# Patient Record
Sex: MALE | Race: WHITE | NOT HISPANIC OR LATINO | Employment: UNEMPLOYED | URBAN - METROPOLITAN AREA
[De-identification: names, ages, dates, MRNs, and addresses within clinical notes are randomized per-mention and may not be internally consistent; named-entity substitution may affect disease eponyms.]

---

## 2017-08-22 DIAGNOSIS — I48.91 ATRIAL FIBRILLATION, UNSPECIFIED TYPE (HCC): ICD-10-CM

## 2017-08-22 DIAGNOSIS — I10 ESSENTIAL HYPERTENSION: ICD-10-CM

## 2017-08-22 RX ORDER — LISINOPRIL AND HYDROCHLOROTHIAZIDE 20; 12.5 MG/1; MG/1
1 TABLET ORAL DAILY
Qty: 90 TAB | Refills: 0 | Status: SHIPPED | OUTPATIENT
Start: 2017-08-22 | End: 2017-11-28 | Stop reason: SDUPTHER

## 2017-08-22 RX ORDER — LISINOPRIL 20 MG/1
20 TABLET ORAL DAILY
Qty: 90 TAB | Refills: 0 | Status: SHIPPED | OUTPATIENT
Start: 2017-08-22 | End: 2017-12-07 | Stop reason: SDUPTHER

## 2017-08-22 RX ORDER — METOPROLOL SUCCINATE 25 MG/1
25 TABLET, EXTENDED RELEASE ORAL DAILY
Qty: 90 TAB | Refills: 0 | Status: SHIPPED | OUTPATIENT
Start: 2017-08-22 | End: 2017-11-28 | Stop reason: SDUPTHER

## 2017-08-22 NOTE — PROGRESS NOTES
Spoke with patient on the telephone. Patient needs medication refills for his blood pressure. He was seeing cardiology but thinks his cardiologist retired. Refer patient to a new cardiologist. Take all medication as directed.

## 2017-11-28 DIAGNOSIS — I48.91 ATRIAL FIBRILLATION, UNSPECIFIED TYPE (HCC): ICD-10-CM

## 2017-11-28 DIAGNOSIS — I10 ESSENTIAL HYPERTENSION: ICD-10-CM

## 2017-11-28 RX ORDER — METOPROLOL SUCCINATE 25 MG/1
25 TABLET, EXTENDED RELEASE ORAL DAILY
Qty: 90 TAB | Refills: 0 | Status: SHIPPED | OUTPATIENT
Start: 2017-11-28 | End: 2018-10-01

## 2017-11-28 RX ORDER — LISINOPRIL AND HYDROCHLOROTHIAZIDE 20; 12.5 MG/1; MG/1
1 TABLET ORAL DAILY
Qty: 90 TAB | Refills: 0 | Status: SHIPPED | OUTPATIENT
Start: 2017-11-28 | End: 2018-10-01 | Stop reason: SDUPTHER

## 2017-12-07 DIAGNOSIS — I10 ESSENTIAL HYPERTENSION: ICD-10-CM

## 2017-12-07 RX ORDER — LISINOPRIL 20 MG/1
20 TABLET ORAL DAILY
Qty: 90 TAB | Refills: 0 | Status: SHIPPED | OUTPATIENT
Start: 2017-12-07 | End: 2018-03-07

## 2018-01-16 ENCOUNTER — APPOINTMENT (OUTPATIENT)
Dept: RADIOLOGY | Facility: MEDICAL CENTER | Age: 58
End: 2018-01-16
Attending: EMERGENCY MEDICINE
Payer: COMMERCIAL

## 2018-01-16 ENCOUNTER — HOSPITAL ENCOUNTER (EMERGENCY)
Facility: MEDICAL CENTER | Age: 58
End: 2018-01-16
Attending: EMERGENCY MEDICINE
Payer: COMMERCIAL

## 2018-01-16 VITALS
OXYGEN SATURATION: 96 % | BODY MASS INDEX: 36.45 KG/M2 | HEART RATE: 90 BPM | TEMPERATURE: 99.4 F | DIASTOLIC BLOOD PRESSURE: 108 MMHG | RESPIRATION RATE: 18 BRPM | HEIGHT: 78 IN | SYSTOLIC BLOOD PRESSURE: 153 MMHG | WEIGHT: 315 LBS

## 2018-01-16 DIAGNOSIS — M54.41 CHRONIC BILATERAL LOW BACK PAIN WITH BILATERAL SCIATICA: ICD-10-CM

## 2018-01-16 DIAGNOSIS — M54.42 ACUTE BILATERAL LOW BACK PAIN WITH BILATERAL SCIATICA: ICD-10-CM

## 2018-01-16 DIAGNOSIS — M54.42 CHRONIC BILATERAL LOW BACK PAIN WITH BILATERAL SCIATICA: ICD-10-CM

## 2018-01-16 DIAGNOSIS — G89.29 CHRONIC BILATERAL LOW BACK PAIN WITH BILATERAL SCIATICA: ICD-10-CM

## 2018-01-16 DIAGNOSIS — M54.41 ACUTE BILATERAL LOW BACK PAIN WITH BILATERAL SCIATICA: ICD-10-CM

## 2018-01-16 PROCEDURE — 72100 X-RAY EXAM L-S SPINE 2/3 VWS: CPT

## 2018-01-16 PROCEDURE — 99284 EMERGENCY DEPT VISIT MOD MDM: CPT

## 2018-01-16 PROCEDURE — 72170 X-RAY EXAM OF PELVIS: CPT

## 2018-01-16 RX ORDER — CYCLOBENZAPRINE HCL 10 MG
10 TABLET ORAL 3 TIMES DAILY PRN
Qty: 30 TAB | Refills: 0 | Status: SHIPPED | OUTPATIENT
Start: 2018-01-16 | End: 2018-01-23

## 2018-01-16 ASSESSMENT — PAIN SCALES - GENERAL: PAINLEVEL_OUTOF10: 6

## 2018-01-16 NOTE — LETTER
"  FORM C-4:  EMPLOYEE’S CLAIM FOR COMPENSATION/ REPORT OF INITIAL TREATMENT  EMPLOYEE’S CLAIM - PROVIDE ALL INFORMATION REQUESTED   First Name  Bg Last Name  Jerry Birthdate             Age  1960 57 y.o. Sex  male Claim Number   Home Employee Address  [address]  [facility]                                     Zip  [address] Height  1.981 m (6' 6\") Weight  (!) 159.1 kg (350 lb 12 oz) [facility]     Mailing Employee Address                           [address]   [facility]               Zip  [address] Telephone  [phone] (home) [phone] (work) Primary Language Spoken  ENGLISH   Insurer  [employer] [facility] [employer]   MISC WORKERS COMP Employee's Occupation (Job Title) When Injury or Occupational Disease Occurred     Employer's Name  [employer] Telephone  [phone]    Employer Address  [address] Zip  [address]   Date of Injury  1/11/2018       Hour of Injury  10:30 AM Date Employer Notified  1/12/2018 Last Day of Work after Injury or Occupational Disease  1/16/2018 Supervisor to Whom Injury Reported  Howard Campo   Address or Location of Accident (if applicable)  [[address]]   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 (if applicable)  lifting chain link fence    How did this injury or occupational disease occur? Be specific and answer in detail. Use additional sheet if necessary)  lifting fallen fence & it continued to fall in opposite direction pulling me with it.  fell   to ground on all fours & heard back \"cracking\"   If you believe that you have an occupational disease, when did you first have knowledge of the disability and it relationship to your employment?  na Witnesses to the Accident  Ap Alvarado     Nature of Injury or Occupational Disease  Strain  Part(s) of Body Injured or Affected  Lower Back Area (Lumbar Area & Lumbo-Sacral), Hip (R), Lower Leg (R)    I certify that the " above is true and correct to the best of my knowledge and that I have provided this information in order to obtain the benefits of [place] inclusive or Chapter 617 of [place]RS[place]  I hereby authorize any physician, chiropractor, surgeon, practitioner, or other person, any hospital, including [facility], any medical service organization, any insurance company, or other institution or organization to release to each other, any medical or other information, including benefits paid or payable, pertinent to this injury or disease, except information relative to diagnosis, treatment and/or counseling for AIDS, psychological conditions, alcohol or controlled substances, for which I must give specific authorization.  A Photostat of this authorization shall be as valid as the original.   Date  January 16, 2018 Place  [facility] Employee’s Signature   THIS REPORT MUST BE COMPLETED AND MAILED WITHIN 3 WORKING DAYS OF TREATMENT   Place  [facility], EMERGENCY DEPT  Name of Facility   [facility]   Date  1/16/2018 Diagnosis  (M54.42,  M54.41) Acute bilateral low back pain with bilateral sciatica  (M54.42,  M54.41,  G89.29) Chronic bilateral low back pain with bilateral sciatica Is there evidence the injured employee was under the influence of alcohol and/or another controlled substance at the time of accident?   Hour  4:40 PM Description of Injury or Disease  Acute bilateral low back pain with bilateral sciatica  Chronic bilateral low back pain with bilateral sciatica No   Treatment  Prescription for cyclobenzaprine, instructed use ibuprofen and ice.  Have you advised the patient to remain off work five days or more?         No   X-Ray Findings  Negative  Comments:no acute lumbar spine fracture or pelvic abnormality   If Yes   From Date    To Date      From information  "given by the employee, together with medical evidence, can you directly connect this injury or occupational disease as job incurred?  Yes If No, is the employee capable of: Full Duty  No Modified Duty  Yes   Is additional medical care by a physician indicated?  Yes  Comments:possible physical therapy If Modified Duty, Specify any Limitations / Restrictions  No lifting greater than 10 pounds for 2 days.     Do you know of any previous injury or disease contributing to this condition or occupational disease?    Comments:the patient does have a history of laminectomy in the past with acute injury upon this previous surgical intervention.   Date  1/16/2018 Print Doctor’s Name  Jarret Mac certify the employer’s copy of this form was mailed on:   Address  [address] [address] [phone] Insurer’s Use Only   [facility]  [address]    Provider’s Tax ID Number  [id number] Telephone  Dept: [phone]    Doctor’s Signature  e-JARRET Bryson D.O. Degree  MD    Original - TREATING PHYSICIAN OR CHIROPRACTOR   Pg 2-Insurer/TPA   Pg 3-Employer   Pg 4-Employee                                                                                                  Form C-4 (rev01/03)     BRIEF DESCRIPTION OF RIGHTS AND BENEFITS  (Pursuant to [place]RS 616C.050)    Notice of Injury or Occupational Disease (Incident Report Form C-1): If an injury or occupational disease (OD) arises out of and in the course of employment, you must provide written notice to your employer as soon as practicable, but no later than 7 days after the accident or OD. Your employer shall maintain a sufficient supply of the required forms.    Claim for Compensation (Form C-4): If medical treatment is sought, the form C-4 is available at the place of initial treatment. A completed \"Claim for Compensation\" (Form C-4) must be filed within 90 days after an accident or OD. The treating physician or chiropractor " must, within 3 working days after treatment, complete and mail to the employer, the employer's insurer and third-party , the Claim for Compensation.    Medical Treatment: If you require medical treatment for your on-the-job injury or OD, you may be required to select a physician or chiropractor from a list provided by your workers’ compensation insurer, if it has contracted with an Organization for Managed Care (MCO) or Preferred Provider Organization (PPO) or providers of health care. If your employer has not entered into a contract with an MCO or PPO, you may select a physician or chiropractor from the Panel of Physicians and Chiropractors. Any medical costs related to your industrial injury or OD will be paid by your insurer.    Temporary Total Disability (TTD): If your doctor has certified that you are unable to work for a period of at least 5 consecutive days, or 5 cumulative days in a 20-day period, or places restrictions on you that your employer does not accommodate, you may be entitled to TTD compensation.    Temporary Partial Disability (TPD): If the wage you receive upon reemployment is less than the compensation for TTD to which you are entitled, the insurer may be required to pay you TPD compensation to make up the difference. TPD can only be paid for a maximum of 24 months.    Permanent Partial Disability (PPD): When your medical condition is stable and there is an indication of a PPD as a result of your injury or OD, within 30 days, your insurer must arrange for an evaluation by a rating physician or chiropractor to determine the degree of your PPD. The amount of your PPD award depends on the date of injury, the results of the PPD evaluation and your age and wage.    Permanent Total Disability (PTD): If you are medically certified by a treating physician or chiropractor as permanently and totally disabled and have been granted a PTD status by your insurer, you are entitled to receive  monthly benefits not to exceed 66 2/3% of your average monthly wage. The amount of your PTD payments is subject to reduction if you previously received a PPD award.    Vocational Rehabilitation Services: You may be eligible for vocational rehabilitation services if you are unable to return to the job due to a permanent physical impairment or permanent restrictions as a result of your injury or occupational disease.    Transportation and Per Megan Reimbursement: You may be eligible for travel expenses and per megan associated with medical treatment.  Reopening: You may be able to reopen your claim if your condition worsens after claim closure.    Appeal Process: If you disagree with a written determination issued by the insurer or the insurer does not respond to your request, you may appeal to the Department of Administration, , by following the instructions contained in your determination letter. You must appeal the determination within 70 days from the date of the determination letter at [address], [address], [place], [place] [address], or [address], [address], [place] [address]. If you disagree with the  decision, you may appeal to the Department of Administration, . You must file your appeal within 30 days from the date of the  decision letter at [address], [address], [place], [place] [address], or [address], [address],  [address]. If you disagree with a decision of an , you may file a petition for judicial review with the District Court. You must do so within 30 days of the Appeal Officer’s decision. You may be represented by an  at your own expense or you may contact the [facility] for possible representation.    [place]  for Injured Workers (NAIW): If you disagree with a  decision, you may request that NAIW represent you without charge at an Appeals  Officer Hearing. For information regarding denial of benefits, you may contact the [facility] at: [address], [address], [place] [address], [phone]) [phone], or [address], [place] [address], [phone]    To File a Complaint with the Division: If you wish to file a complaint with the  of the Division of Industrial Relations (DIR), please contact the Workers’ Compensation Section, [address] [address], [place], [place] [address], telephone ([phone]) [phone], or [address], [address], [place], [place] [address], telephone [phone].    For assistance with Workers’ Compensation Issues: you may contact the Office of the Governor Consumer Health Assistance, [address] [place] [address], [address], [place], [place] [address], Toll Free 1-864.306.1841, Web site: http://govcha.[facility].nv.us, E-mail [email or web address].                                                                                                                                                                               __________________________________________________________________                                    January 16, 2018            Employee Name / Signature                                                                                                                            Date                                       D-2 (rev. 10/07)

## 2018-01-16 NOTE — LETTER
"  FORM C-4:  EMPLOYEE’S CLAIM FOR COMPENSATION/ REPORT OF INITIAL TREATMENT  EMPLOYEE’S CLAIM - PROVIDE ALL INFORMATION REQUESTED   First Name  Bg Last Name  Jerry Birthdate             Age  1960 57 y.o. 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 Be specific and answer in detail. Use additional sheet if necessary)  Walked down hill stepped on uneven ground caught myself   If you believe that you have an occupational disease, when did you first have knowledge of the disability and it relationship to your employment?  N/A Witnesses to the Accident  Ernesto      Nature of Injury or Occupational Disease  Strain  Part(s) of Body Injured or Affected  Lower Leg (R), Upper Leg (R), N/A    I certify that the above is true and correct to the best of my knowledge and that I have provided this information in order to obtain the benefits of " [place] 616A [place], inclusive or Chapter 617 of [place]RS).  I hereby authorize any physician, chiropractor, surgeon, practitioner, or other person, any hospital, including [facility] hospital, any medical service organization, any insurance company, or other institution or organization to release to each other, any medical or other information, including benefits paid or payable, pertinent to this injury or disease, except information relative to diagnosis, treatment and/or counseling for AIDS, psychological conditions, alcohol or controlled substances, for which I must give specific authorization.  A Photostat of this authorization shall be as valid as the original.   Date Place   Employee’s Signature   THIS REPORT MUST BE COMPLETED AND MAILED WITHIN 3 WORKING DAYS OF TREATMENT   Place  [facility], EMERGENCY DEPT  Name of Facility   [facility]   Date  1/16/2018 Diagnosis  (M54.42,  G89.29) Chronic bilateral low back pain with left-sided sciatica Is there evidence the injured employee was under the influence of alcohol and/or another controlled substance at the time of accident?   Hour  4:16 PM Description of Injury or Disease  Chronic bilateral low back pain with left-sided sciatica No   Treatment  Prescription for cyclobenzaprine, instructed use ibuprofen and ice.  Have you advised the patient to remain off work five days or more?         No   X-Ray Findings  Negative  Comments:no acute lumbar spine fracture or pelvic abnormality   If Yes   From Date    To Date      From information given by the employee, together with medical evidence, can you directly connect this injury or occupational disease as job incurred?  Yes If No, is the employee capable of: Full Duty  No Modified Duty  Yes   Is additional medical care by a physician indicated?  Yes  Comments:possible physical therapy If  "Modified Duty, Specify any Limitations / Restrictions  No lifting greater than 10 pounds for 2 days.     Do you know of any previous injury or disease contributing to this condition or occupational disease?    Comments:the patient does have a history of laminectomy in the past with acute injury upon this previous surgical intervention.   Date  1/16/2018 Print Doctor’s Name  Jarret Mac certify the employer’s copy of this form was mailed on:   Address  [address] [id number] [phone] Insurer’s Use Only   [facility]  [address]    Provider’s Tax ID Number  [id number] Telephone  Dept: [phone]    Doctor’s Signature  e-JARRET Bryson D.O. Degree       Original - TREATING PHYSICIAN OR CHIROPRACTOR   Pg 2-Insurer/TPA   Pg 3-Employer   Pg 4-Employee                                                                                                  Form C-4 (rev01/03)     BRIEF DESCRIPTION OF RIGHTS AND BENEFITS  ([place]ursuant to [place]RS 616C.050)    Notice of Injury or Occupational Disease (Incident Report Form C-1): If an injury or occupational disease (OD) arises out of and in the course of employment, you must provide written notice to your employer as soon as practicable, but no later than 7 days after the accident or OD. Your employer shall maintain a sufficient supply of the required forms.    Claim for Compensation (Form C-4): If medical treatment is sought, the form C-4 is available at the place of initial treatment. A completed \"Claim for Compensation\" (Form C-4) must be filed within 90 days after an accident or OD. The treating physician or chiropractor must, within 3 working days after treatment, complete and mail to the employer, the employer's insurer and third-party , the Claim for Compensation.    Medical Treatment: If you require medical treatment for your on-the-job injury or OD, you may be required to select a physician or chiropractor " from a list provided by your workers’ compensation insurer, if it has contracted with an Organization for Managed Care (MCO) or Preferred Provider Organization (PPO) or providers of health care. If your employer has not entered into a contract with an MCO or PPO, you may select a physician or chiropractor from the Panel of Physicians and Chiropractors. Any medical costs related to your industrial injury or OD will be paid by your insurer.    Temporary Total Disability (TTD): If your doctor has certified that you are unable to work for a period of at least 5 consecutive days, or 5 cumulative days in a 20-day period, or places restrictions on you that your employer does not accommodate, you may be entitled to TTD compensation.    Temporary Partial Disability (TPD): If the wage you receive upon reemployment is less than the compensation for TTD to which you are entitled, the insurer may be required to pay you TPD compensation to make up the difference. TPD can only be paid for a maximum of 24 months.    Permanent Partial Disability (PPD): When your medical condition is stable and there is an indication of a PPD as a result of your injury or OD, within 30 days, your insurer must arrange for an evaluation by a rating physician or chiropractor to determine the degree of your PPD. The amount of your PPD award depends on the date of injury, the results of the PPD evaluation and your age and wage.    Permanent Total Disability (PTD): If you are medically certified by a treating physician or chiropractor as permanently and totally disabled and have been granted a PTD status by your insurer, you are entitled to receive monthly benefits not to exceed 66 2/3% of your average monthly wage. The amount of your PTD payments is subject to reduction if you previously received a PPD award.    Vocational Rehabilitation Services: You may be eligible for vocational rehabilitation services if you are unable to return to the job due to a  permanent physical impairment or permanent restrictions as a result of your injury or occupational disease.    Transportation and Per Megan Reimbursement: You may be eligible for travel expenses and per megan associated with medical treatment.  Reopening: You may be able to reopen your claim if your condition worsens after claim closure.    Appeal Process: If you disagree with a written determination issued by the insurer or the insurer does not respond to your request, you may appeal to the Department of Administration, , by following the instructions contained in your determination letter. You must appeal the determination within 70 days from the date of the determination letter at [address], [address], [place], [place] [address], or [address], [address], [place] [address]. If you disagree with the  decision, you may appeal to the Department of Administration, . You must file your appeal within 30 days from the date of the  decision letter at [address], [address], [place], [place] [address], or [address], [place] [address]. If you disagree with a decision of an , you may file a petition for judicial review with the District Court. You must do so within 30 days of the Appeal Officer’s decision. You may be represented by an  at your own expense or you may contact the [facility] for possible representation.    [place]  for Injured Workers ([place]IW): If you disagree with a  decision, you may request that NAIW represent you without charge at an  Hearing. For information regarding denial of benefits, you may contact the [facility] at: [address], [address], [place] [address], [phone]) [phone], or [address], [place] [address], [phone]    To File a Complaint with the Division: If you wish to file a complaint  with the  of the Division of Industrial Relations (DIR), please contact the Workers’ Compensation Section, [address], [address], [place], [place] [address], telephone (341) 796-5203, or [address], [address], [place], [place] [address], telephone [phone].    For assistance with Workers’ Compensation Issues: you may contact the Office of the Governor Consumer Health Assistance, [address] [place] [address], [address], [place], [place] [address], Toll Free 1-378.868.7770, Web site: http://Oh BiBi.[facility].nv., E-mail [email or web address].                                                                                                                                                                               __________________________________________________________________                                    _________________            Employee Name / Signature                                                                                                                            Date                                       D-2 (rev. 10/07)

## 2018-01-16 NOTE — ED NOTES
Assumed care of pt in armstrong bed. erp at bedside. Pt amb to er from work with c/o bilat hip pain

## 2018-01-16 NOTE — LETTER
"  FORM C-4:  EMPLOYEE’S CLAIM FOR COMPENSATION/ REPORT OF INITIAL TREATMENT  EMPLOYEE’S CLAIM - PROVIDE ALL INFORMATION REQUESTED   First Name  Bg Last Name  Jerry Birthdate             Age  1960 57 y.o. 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 (if applicable)  lifting chain link fence    How did this injury or occupational disease occur? Be specific and answer in detail. Use additional sheet if necessary)  lifting fallen fence & it continued to fall in opposite direction pulling me with it.  fell   to ground on all fours & heard back \"cracking\"   If you believe that you have an occupational disease, when did you first have knowledge of the disability and it relationship to your employment?  na Witnesses to the Accident  Ap Alvarado     Nature of Injury or Occupational Disease  Strain  Part(s) of Body Injured or Affected  Lower Back Area (Lumbar Area & Lumbo-Sacral), Hip (R), Lower Leg (R)    "   I certify that the above is true and correct to the best of my knowledge and that I have provided this information in order to obtain the benefits of [place]  I hereby authorize any physician, chiropractor, surgeon, practitioner, or other person, any hospital, including [facility] or [facility], any medical service organization, any insurance company, or other institution or organization to release to each other, any medical or other information, including benefits paid or payable, pertinent to this injury or disease, except information relative to diagnosis, treatment and/or counseling for AIDS, psychological conditions, alcohol or controlled substances, for which I must give specific authorization.  A Photostat of this authorization shall be as valid as the original.   Date  01/16/2018 [facility] Employee’s Signature   THIS REPORT MUST BE COMPLETED AND MAILED WITHIN 3 WORKING DAYS OF TREATMENT   Place  [facility] [place], EMERGENCY DEPT  Name of Facility   [facility] [place]   Date  1/16/2018 Diagnosis  (M54.42,  G89.29) Chronic bilateral low back pain with left-sided sciatica Is there evidence the injured employee was under the influence of alcohol and/or another controlled substance at the time of accident?   Hour  4:20 PM Description of Injury or Disease  Chronic bilateral low back pain with left-sided sciatica No   Treatment  Prescription for cyclobenzaprine, instructed use ibuprofen and ice.  Have you advised the patient to remain off work five days or more?         No   X-Ray Findings  Negative  Comments:no acute lumbar spine fracture or pelvic abnormality   If Yes   From Date    To Date      From information given by the employee, together with medical evidence, can you directly connect this injury or occupational disease as  "job incurred?  Yes If No, is the employee capable of: Full Duty  No Modified Duty  Yes   Is additional medical care by a physician indicated?  Yes  Comments:possible physical therapy If Modified Duty, Specify any Limitations / Restrictions  No lifting greater than 10 pounds for 2 days.     Do you know of any previous injury or disease contributing to this condition or occupational disease?    Comments:the patient does have a history of laminectomy in the past with acute injury upon this previous surgical intervention.   Date  1/16/2018 Print Doctor’s Name  Jarret Mac certify the employer’s copy of this form was mailed on:   Address  [address]  [facility] [place] [facility] [address] [phone] Insurer’s Use Only   [facility]  [address]    Provider’s Tax ID Number  [id number] Telephone  Dept: [phone]    Doctor’s Signature  e-JARRET Bryson D.O. Degree   MD    Original - TREATING PHYSICIAN OR CHIROPRACTOR   Pg 2-Insurer/TPA   Pg 3-Employer   Pg 4-Employee                                                                                                  Form C-4 (rev01/03)     BRIEF DESCRIPTION OF RIGHTS AND BENEFITS  (Pursuant to [place] 616C.050)    Notice of Injury or Occupational Disease (Incident Report Form C-1): If an injury or occupational disease (OD) arises out of and in the course of employment, you must provide written notice to your employer as soon as practicable, but no later than 7 days after the accident or OD. Your employer shall maintain a sufficient supply of the required forms.    Claim for Compensation (Form C-4): If medical treatment is sought, the form C-4 is available at the place of initial treatment. A completed \"Claim for Compensation\" (Form C-4) must be filed within 90 days after an accident or OD. The treating physician or chiropractor must, within 3 working days after treatment, complete and mail to the employer, the employer's insurer and third-party " , the Claim for Compensation.    Medical Treatment: If you require medical treatment for your on-the-job injury or OD, you may be required to select a physician or chiropractor from a list provided by your workers’ compensation insurer, if it has contracted with an Organization for Managed Care (MCO) or Preferred Provider Organization (PPO) or providers of health care. If your employer has not entered into a contract with an MCO or PPO, you may select a physician or chiropractor from the Panel of Physicians and Chiropractors. Any medical costs related to your industrial injury or OD will be paid by your insurer.    Temporary Total Disability (TTD): If your doctor has certified that you are unable to work for a period of at least 5 consecutive days, or 5 cumulative days in a 20-day period, or places restrictions on you that your employer does not accommodate, you may be entitled to TTD compensation.    Temporary Partial Disability (TPD): If the wage you receive upon reemployment is less than the compensation for TTD to which you are entitled, the insurer may be required to pay you TPD compensation to make up the difference. TPD can only be paid for a maximum of 24 months.    Permanent Partial Disability (PPD): When your medical condition is stable and there is an indication of a PPD as a result of your injury or OD, within 30 days, your insurer must arrange for an evaluation by a rating physician or chiropractor to determine the degree of your PPD. The amount of your PPD award depends on the date of injury, the results of the PPD evaluation and your age and wage.    Permanent Total Disability (PTD): If you are medically certified by a treating physician or chiropractor as permanently and totally disabled and have been granted a PTD status by your insurer, you are entitled to receive monthly benefits not to exceed 66 2/3% of your average monthly wage. The amount of your PTD payments is subject to  reduction if you previously received a PPD award.    Vocational Rehabilitation Services: You may be eligible for vocational rehabilitation services if you are unable to return to the job due to a permanent physical impairment or permanent restrictions as a result of your injury or occupational disease.    Transportation and Per Megan Reimbursement: You may be eligible for travel expenses and per megan associated with medical treatment.  Reopening: You may be able to reopen your claim if your condition worsens after claim closure.    Appeal Process: If you disagree with a written determination issued by the insurer or the insurer does not respond to your request, you may appeal to the Department of Administration, , by following the instructions contained in your determination letter. You must appeal the determination within 70 days from the date of the determination letter at [address], [address], [place], [place] [address], or [address], [place] [address]. If you disagree with the  decision, you may appeal to the Department of Administration, . You must file your appeal within 30 days from the date of the  decision letter at [address], [address], [place], [place] [address], or [address], [address], [place], [place] [address]. If you disagree with a decision of an , you may file a petition for judicial review with the District Court. You must do so within 30 days of the Appeal Officer’s decision. You may be represented by an  at your own expense or you may contact the [facility] for possible representation.    [place]  for Injured Workers (NAIW): If you disagree with a  decision, you may request that NAIW represent you without charge at an  Hearing. For information regarding denial of benefits, you may contact the [facility] at: [address],  [address], [place] [address], [phone], or [address], [address], [place] [address], [phone]    To File a Complaint with the Division: If you wish to file a complaint with the  of the Division of Industrial Relations (DIR), please contact the Workers’ Compensation Section, [address], [address], [place], [place] [address], telephone [phone], or [address], [address], [place], [place] [address], telephone [phone].    For assistance with Workers’ Compensation Issues: you may contact the Office of the Governor Consumer Health Assistance, [address] [place] [address], [address], [place], [place] [address], Toll Free 1-579.526.5020, Web site: http://[employer].cn.[facility], [facility].[email or web address]., E-mail [email or web address], [email or web address].                                                                                                                                                                               __________________________________________________________________                                    _________________            Employee Name / Signature                                                                                                                            Date                                       D-2 (rev. 10/07)

## 2018-01-16 NOTE — ED PROVIDER NOTES
ED Provider Note    CHIEF COMPLAINT  Chief Complaint   Patient presents with   • Fall     fell over fence at work today  low back pain, caren hip pain and caren leg pain       HPI  Bg Edwards is a 57 y.o. male who presents to the emergency department with complaint of low back pain. He states he has significant low back pain is chronic and set laminectomies in the past. Patient states that a few days ago he was trying to hold a fence and fell over with the fence giveaway resulting in strain to his low back. He denies loss of sensation or strength to his lower extremities, saddle anesthesia, urinary constant urinary retention, fever, use of anti-inflammatories. He is here secondary to the fact that he states his dad broke his back and became paralyzed and no one really figured it out he is asking for x-rays.  Pain is dull in nature, 7/10 with radiation to bilateral hips into left knee  REVIEW OF SYSTEMS  Positives as above. Pertinent negatives include urinary incontinence, urinary retention, saddle anesthesia, fever, use of anticoagulation, loss of sensation or strength to his lower extremities  All other review of systems are negative    PAST MEDICAL HISTORY  Past Medical History:   Diagnosis Date   • Abnormal EKG    • Atrial fibrillation (CMS-HCC)    • Dyspnea    • Hypertension    • Pain 6   • Paroxysmal atrial fibrillation (CMS-HCC)    • Snoring        FAMILY HISTORY  Noncontributory    SOCIAL HISTORY  Social History     Social History   • Marital status:      Spouse name: N/A   • Number of children: N/A   • Years of education: N/A     Social History Main Topics   • Smoking status: Former Smoker     Types: Cigars   • Smokeless tobacco: Not on file      Comment: 15 YEARS  QUIT IN 95   • Alcohol use 1.5 oz/week     3 Standard drinks or equivalent per week      Comment: weekly    • Drug use: No      Comment: marijuana   • Sexual activity: Not on file      Comment: na     Other Topics Concern   • Not on  "file     Social History Narrative   • No narrative on file       SURGICAL HISTORY  Past Surgical History:   Procedure Laterality Date   • FOREIGN BODY REMOVAL  6/29/2010    Performed by BRI HICKS at SURGERY SAME DAY [facility]   • LUMBAR LAMINECTOMY DISKECTOMY Bilateral    • OTHER ORTHOPEDIC SURGERY  AC RECONSTRUCTION   • OTHER ORTHOPEDIC SURGERY  LEFT THUMB   • TONSILLECTOMY     • VASECTOMY         CURRENT MEDICATIONS  Home Medications     Reviewed by Brandon Simons (Pharmacy Tech) on 01/16/18 at 1458  Med List Status: Complete   Medication Last Dose Status   lisinopril (PRINIVIL) 20 MG Tab 1/16/2018 Active   lisinopril-hydrochlorothiazide (PRINZIDE, ZESTORETIC) 20-12.5 MG per tablet 1/16/2018 Active   MAGNESIUM PO 1/16/2018 Active   metoprolol SR (TOPROL XL) 25 MG TABLET SR 24 HR 1/16/2018 Active   MILK THISTLE PO 1/16/2018 Active   POTASSIUM PO 1/16/2018 Active   Saw Palmetto, Serenoa repens, (SAW PALMETTO PO) 1/16/2018 Active   Thiamine Mononitrate (VITAMIN B1 PO) 1/16/2018 Active                ALLERGIES  Allergies   Allergen Reactions   • Nkda [No Known Drug Allergy]        PHYSICAL EXAM  VITAL SIGNS: /108   Pulse 90   Temp 37.4 °C (99.4 °F)   Resp 18   Ht 1.981 m (6' 6\")   Wt (!) 159.1 kg (350 lb 12 oz)   SpO2 96%   BMI 40.53 kg/m²    Constitutional: Well developed, Well nourished, No acute distress, Non-toxic appearance.   Eyes: PERRLA, EOMI, Conjunctiva normal, No discharge.    Abdomen: Bowel sounds normal, Soft, No tenderness, No guarding, No rebound, No pulsatile masses.   Skin: Warm, Dry, No erythema, No rash.   Extremities: Full range of motion, no deformity, no edema.  Back: No central spinous process tenderness in the lumbar region, does have paravertebral muscle spasm  Skin: No erythema, no rash, no purpura  Neurologic: No saddle anesthesia, leg with 5/5 bilaterally, plantar flexion dorsiflexion 5/5 bilaterally, DTRs are 2/4 L4-S1 bilaterally, normal " ambulation  Psychiatric: Affect normal for clinical presentation.      RADIOLOGY/PROCEDURES  DX-LUMBAR SPINE-2 OR 3 VIEWS   Final Result      1.  No radiographic evidence of acute traumatic injury      2.  Worsening moderate degenerative disc disease and diffuse idiopathic skeletal hyperostosis now with grade 1 L4/5 anterolisthesis and 11 degrees dextroscoliosis      3.  Probable right hemilaminectomies      DX-PELVIS-1 OR 2 VIEWS   Final Result      No radiographic evidence of acute traumatic injury      Severe right, mild left hip osteoarthritis        COURSE & MEDICAL DECISION MAKING  Pertinent Labs & Imaging studies reviewed. (See chart for details)  This is a pleasant 57-year-old gentleman presents with chronic back pain and acute exacerbation. He has no evidence of cauda equina syndrome, no red flags for epidural abscess, epidural hematoma. X-rays are completed secondary to traumatic injury reveals no evidence of fracture or significant acute changes although does have degenerative disc disease findings as well as evidence of previous hemilaminectomies. The patient received cyclobenzaprine and prescription for the same. I have filled out the Workmen's Comp. C4 part to and the patient will following up with his Workmen's Compensation. She return precautions have been given to [facility] as he denies spine surgeon on call this facility.    Discharge Medication List as of 1/16/2018  4:40 PM      START taking these medications    Details   cyclobenzaprine (FLEXERIL) 10 MG Tab Take 1 Tab by mouth 3 times a day as needed., Disp-30 Tab, R-0, Normal             FINAL IMPRESSION     1. Acute bilateral low back pain with bilateral sciatica    2. Chronic bilateral low back pain with bilateral sciatica      DISPOSITION:  Patient will be discharged home in stable condition.    FOLLOW UP:  [address] [address]  [phone]    Schedule an appointment as soon as possible  for a visit      [address] [phone]    If symptoms worsen      OUTPATIENT MEDICATIONS:  Discharge Medication List as of 1/16/2018  4:40 PM      START taking these medications    Details   cyclobenzaprine (FLEXERIL) 10 MG Tab Take 1 Tab by mouth 3 times a day as needed., Disp-30 Tab, R-0, Normal                    Electronically signed by: Ankur Mac, 1/16/2018 3:56 PM

## 2018-01-17 NOTE — ED NOTES
D/c instructions reviewed with pt. To  prescription at [facility] in [place] , f/u with pcp regarding bp as well as workers comp. Return to regional for worsening s/s

## 2018-01-17 NOTE — DISCHARGE INSTRUCTIONS
Follow up with your primary care physician for re-evaluation of your blood pressure.      Chronic Back Pain   When back pain lasts longer than 3 months, it is called chronic back pain. People with chronic back pain often go through certain periods that are more intense (flare-ups).   CAUSES  Chronic back pain can be caused by wear and tear (degeneration) on different structures in your back. These structures include:  · The bones of your spine (vertebrae) and the joints surrounding your spinal cord and nerve roots (facets).  · The strong, fibrous tissues that connect your vertebrae (ligaments).  Degeneration of these structures may result in pressure on your nerves. This can lead to constant pain.  HOME CARE INSTRUCTIONS  · Avoid bending, heavy lifting, prolonged sitting, and activities which make the problem worse.  · Take brief periods of rest throughout the day to reduce your pain. Lying down or standing usually is better than sitting while you are resting.  · Take over-the-counter or prescription medicines only as directed by your caregiver.  SEEK IMMEDIATE MEDICAL CARE IF:   · You have weakness or numbness in one of your legs or feet.  · You have trouble controlling your bladder or bowels.  · You have nausea, vomiting, abdominal pain, shortness of breath, or fainting.     This information is not intended to replace advice given to you by your health care provider. Make sure you discuss any questions you have with your health care provider.     Document Released: 01/25/2006 Document Revised: 03/11/2013 Document Reviewed: 12/01/2012  [employer] Patient Education ©2016 [employer]

## 2018-01-17 NOTE — ED NOTES
Attempted to d/c pt-states paperwork in correct-is not left sided, is bilateral. Will discuss with erp when available

## 2018-01-23 ENCOUNTER — OCCUPATIONAL MEDICINE (OUTPATIENT)
Dept: OCCUPATIONAL MEDICINE | Facility: CLINIC | Age: 58
End: 2018-01-23
Payer: COMMERCIAL

## 2018-01-23 VITALS
RESPIRATION RATE: 16 BRPM | TEMPERATURE: 98.7 F | HEART RATE: 95 BPM | DIASTOLIC BLOOD PRESSURE: 90 MMHG | WEIGHT: 315 LBS | HEIGHT: 77 IN | OXYGEN SATURATION: 96 % | BODY MASS INDEX: 37.19 KG/M2 | SYSTOLIC BLOOD PRESSURE: 148 MMHG

## 2018-01-23 DIAGNOSIS — S39.012D STRAIN OF LUMBAR REGION, SUBSEQUENT ENCOUNTER: ICD-10-CM

## 2018-01-23 PROCEDURE — 99204 OFFICE O/P NEW MOD 45 MIN: CPT | Performed by: PREVENTIVE MEDICINE

## 2018-01-23 RX ORDER — ETODOLAC 500 MG/1
500 TABLET, FILM COATED ORAL 2 TIMES DAILY
Qty: 60 TAB | Refills: 1 | Status: SHIPPED | OUTPATIENT
Start: 2018-01-23 | End: 2018-10-29

## 2018-01-23 RX ORDER — TIZANIDINE 4 MG/1
4 TABLET ORAL EVERY 6 HOURS PRN
Qty: 30 TAB | Refills: 3 | Status: SHIPPED | OUTPATIENT
Start: 2018-01-23 | End: 2019-02-19

## 2018-01-23 NOTE — LETTER
[address],   [address] [place] [address]  Phone:  [phone] - Fax:  [phone]   Occupational Health [facility] Progress Report and Disability Certification  Date of Service: 1/23/2018   No Show:  No  Date / Time of Next Visit: 2/15/2018@3:00 PM   Claim Information   Patient Name: Bg Edwards  Claim Number:     Employer:   [employer] Date of Injury: 1/10/2018     Insurer / TPA: [employer]  ID / SSN:     Occupation: construction  Diagnosis: The encounter diagnosis was Strain of lumbar region, subsequent encounter.    Medical Information   Related to Industrial Injury?   Comments:indeterminate    Subjective Complaints:  DOI 1/10/2018. Mechanism of injury-fell forward onto ground and tweaked back. 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 This occurred when he fell over a chain link fence he was lifting. He was seen in the emergency department where x-rays were negative. He reports no significant improvement. He continues to have constant midline lumbar back pain with radiation to both hips. He notes paresthesias in the left thigh. No urinary symptoms.   Objective Findings: Appearance: Well-developed, well-nourished.   Mental Status: Mood and Affect normal. Pleasant. Cooperative. Appropriate.   ENT: Oropharynx clear. Moist mucous membranes. Hearing normal.   Eyes: Pupils reactive. Conjunctiva normal. No scleral icterus.   Neck: Trachea Midline. No thyromegaly. No masses.  Cardiovascular: Normal rate. Regular rhythm. Normal heart sounds.   Chest: Effort normal. Breath sounds clear.   Skin: Skin is warm and dry. No rash.   Musculoskeletal: Back exam shows midline lumbosacral surgical scar. Pain with forward flexion and extension. Straight leg raise negative. Distal neurovascular intact.     Pre-Existing Condition(s):     Assessment:   Condition Same    Status: Additional Care Required  Permanent Disability:No    Plan: MedicationMedication (NOT  at Work)PTDiagnostics    Diagnostics: MRI    Comments:  MRI requested    Disability Information   Status: Released to Restricted Duty    From:  1/23/2018  Through: 2/15/2018 Restrictions are: Temporary   Physical Restrictions   Sitting:    Standing:    Stooping:    Bending:  < or = to 1 hr/day   Squatting:    Walking:    Climbing:    Pushing:      Pulling:    Other:    Reaching Above Shoulder (L):   Reaching Above Shoulder (R):       Reaching Below Shoulder (L):    Reaching Below Shoulder (R):      Not to exceed Weight Limits   Carrying(hrs):   Weight Limit(lb):   Lifting(hrs):   Weight  Limit(lb): < or = to 10 pounds   Comments:      Repetitive Actions   Hands: i.e. Fine Manipulations from Grasping:     Feet: i.e. Operating Foot Controls:     Driving / Operate Machinery:     Physician Name: Farzad Broussard M.D. Physician Signature: FARZAD Cabrera M.D. e-Signature: Dr. Jefry Browning, Medical Director   Clinic Name / Location: [address],   [address] [place] [address] Clinic Phone Number: Dept: [phone]   Appointment Time: 3:30 Pm Visit Start Time: 3:29 PM   Check-In Time:  3:19 Pm Visit Discharge Time:  4:20 PM   Original-Treating Physician or Chiropractor    Page 2-Insurer/TPA    Page 3-Employer    Page 4-Employee

## 2018-01-23 NOTE — PROGRESS NOTES
Subjective:      Bg Edwards is a 57 y.o. male who presents with Follow-Up (WC DOI 1/10/18 Lower back feeling the same room 3)      DOI 1/10/2018. Mechanism of injury-fell forward onto ground and tweaked back. 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 This occurred when he fell over a chain link fence he was lifting. He was seen in the emergency department where x-rays were negative. He reports no significant improvement. He continues to have constant midline lumbar back pain with radiation to both hips. He notes paresthesias in the left thigh. No urinary symptoms.     [facility]    ROS  Comprehensive medical history form reviewed. Pertinent positives and negatives included in HPI.    PFSH: reviewed in Epic    PMH:  has a past medical history of Abnormal EKG; Atrial fibrillation (CMS-HCC); Dyspnea; Hypertension; Pain (6); Paroxysmal atrial fibrillation (CMS-HCC); and Snoring. He also has no past medical history of Allergy; Anemia; Anxiety; CAD (coronary artery disease); Cancer (CMS-[facility]); Carotid artery disease (CMS-[facility]); CHF (congestive heart failure) (CMS-[facility]); COPD; Diabetes; Goiter; Heart attack; Heart murmur; Hyperlipidemia; Kidney disease; PVD (peripheral vascular disease) (CMS-[facility]); Stroke (CMS-[facility]); or Ulcer (CMS-[facility]).  MEDS:   Current Outpatient Prescriptions:   •  etodolac (LODINE) 500 MG tablet, Take 1 Tab by mouth 2 times a day., Disp: 60 Tab, Rfl: 1  •  tizanidine (ZANAFLEX) 4 MG Tab, Take 1 Tab by mouth every 6 hours as needed., Disp: 30 Tab, Rfl: 3  •  POTASSIUM PO, Take 1 Cap by mouth every day., Disp: , Rfl:   •  Saw Palmetto, Serenoa repens, (SAW PALMETTO PO), Take 1 Cap by mouth every day., Disp: , Rfl:   •  MAGNESIUM PO, Take 1 Cap by mouth every day., Disp: , Rfl:   •  Thiamine Mononitrate (VITAMIN B1 PO), Take 1 Tab by mouth every day., Disp: , Rfl:   •  MILK THISTLE PO, Take 1 Cap by mouth every day., Disp: , Rfl:   •  lisinopril (PRINIVIL) 20 MG Tab, Take 1 Tab by mouth every day  "for 90 days., Disp: 90 Tab, Rfl: 0  •  lisinopril-hydrochlorothiazide (PRINZIDE, ZESTORETIC) 20-12.5 MG per tablet, Take 1 Tab by mouth every day., Disp: 90 Tab, Rfl: 0  •  metoprolol SR (TOPROL XL) 25 MG TABLET SR 24 HR, Take 1 Tab by mouth every day., Disp: 90 Tab, Rfl: 0  ALLERGIES:   Allergies   Allergen Reactions   • Nkda [No Known Drug Allergy]      SURGHX:   Past Surgical History:   Procedure Laterality Date   • FOREIGN BODY REMOVAL  6/29/2010    Performed by BRI HICKS at SURGERY SAME DAY [facility] ORS   • LUMBAR LAMINECTOMY DISKECTOMY Bilateral    • OTHER ORTHOPEDIC SURGERY  AC RECONSTRUCTION   • OTHER ORTHOPEDIC SURGERY  LEFT THUMB   • TONSILLECTOMY     • VASECTOMY       SOCHX:  reports that he has quit smoking. His smoking use included Cigars. He does not have any smokeless tobacco history on file. He reports that he drinks about 1.5 oz of alcohol per week . He reports that he does not use drugs.  Work Status: Job /Assistant supervisor with [employer]  FH: No pertinent hereditary disorders.        Objective:     /90   Pulse 95   Temp 37.1 °C (98.7 °F)   Resp 16   Ht 1.956 m (6' 5\")   Wt (!) 147.4 kg (325 lb)   SpO2 96%   BMI 38.54 kg/m²      Physical Exam    Appearance: Well-developed, well-nourished.   Mental Status: Mood and Affect normal. Pleasant. Cooperative. Appropriate.   ENT: Oropharynx clear. Moist mucous membranes. Hearing normal.   Eyes: Pupils reactive. Conjunctiva normal. No scleral icterus.   Neck: Trachea Midline. No thyromegaly. No masses.  Cardiovascular: Normal rate. Regular rhythm. Normal heart sounds.   Chest: Effort normal. Breath sounds clear.   Skin: Skin is warm and dry. No rash.   Musculoskeletal: Back exam shows midline lumbosacral surgical scar. Pain with forward flexion and extension. Straight leg raise negative. Distal neurovascular intact.         Assessment/Plan:     1. Strain of lumbar region, subsequent encounter  New to Occupational " Health from emergency department   Condition unimproved  Will request further diagnostic studies to traumatic event and lower extremity symptoms  - etodolac (LODINE) 500 MG tablet; Take 1 Tab by mouth 2 times a day.  Dispense: 60 Tab; Refill: 1  - tizanidine (ZANAFLEX) 4 MG Tab; Take 1 Tab by mouth every 6 hours as needed.  Dispense: 30 Tab; Refill: 3  - REFERRAL TO PHYSICAL THERAPY Reason for Therapy: Eval/Treat/Report  - REFERRAL TO RADIOLOGY  - MR-LUMBAR SPINE-W/O; Future  - Restricted activity  - Recheck in 2-3 weeks or sooner if MRI accomplished

## 2018-01-24 RX ORDER — NABUMETONE 750 MG/1
750 TABLET, FILM COATED ORAL 2 TIMES DAILY
Qty: 60 TAB | Refills: 1 | Status: SHIPPED | OUTPATIENT
Start: 2018-01-24 | End: 2018-10-01

## 2018-02-15 ENCOUNTER — OCCUPATIONAL MEDICINE (OUTPATIENT)
Dept: OCCUPATIONAL MEDICINE | Facility: CLINIC | Age: 58
End: 2018-02-15
Payer: COMMERCIAL

## 2018-02-15 VITALS
SYSTOLIC BLOOD PRESSURE: 152 MMHG | BODY MASS INDEX: 37.19 KG/M2 | TEMPERATURE: 98 F | DIASTOLIC BLOOD PRESSURE: 90 MMHG | OXYGEN SATURATION: 95 % | HEART RATE: 111 BPM | HEIGHT: 77 IN | RESPIRATION RATE: 16 BRPM | WEIGHT: 315 LBS

## 2018-02-15 DIAGNOSIS — S39.012D STRAIN OF LUMBAR REGION, SUBSEQUENT ENCOUNTER: ICD-10-CM

## 2018-02-15 PROCEDURE — 99213 OFFICE O/P EST LOW 20 MIN: CPT | Performed by: PREVENTIVE MEDICINE

## 2018-02-15 NOTE — LETTER
[address],   [address] [address]  Phone:  [phone] - Fax:  [phone]   Occupational Health [facility] Progress Report and Disability Certification  Date of Service: 2/15/2018   No Show:  No  Date / Time of Next Visit: 3/7/2018 @ 4:30pm   Claim Information   Patient Name: Bg Edwards  Claim Number:     Employer:   [employer] Date of Injury: 1/10/2018     Insurer / TPA: [employer]  ID / SSN:     Occupation: construction  Diagnosis: The encounter diagnosis was Strain of lumbar region, subsequent encounter.    Medical Information   Related to Industrial Injury?   Comments:Indeterminate    Subjective Complaints:  DOI 1/10/2018. Mechanism of injury-fell forward onto ground and tweaked back. 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 He seems to indicate slight improvement with physical therapy. He continues to have pain best localized to the lateral upper right hip and anterior hip which is worse with certain movements especially right hip flexion. He states that he feels fairly well in the beginning of the day but then has difficulty later with prolonged walking. He is tolerating most of his regular work duties. MRI has been approved but not scheduled.   Objective Findings: Appearance: Well-developed, well-nourished.   Mental Status: Mood and Affect normal. Pleasant. Cooperative. Appropriate.   Musculoskeletal: Back exam shows normal posture. Normal gait. Right hip shows no tenderness. Pain with certain movements especially flexion.     Pre-Existing Condition(s):     Assessment:   Condition Improved    Status: Additional Care Required  Permanent Disability:No    Plan:      Diagnostics:      Comments:       Disability Information   Status: Released to Restricted Duty    From:  2/15/2018  Through: 3/8/2018 Restrictions are: Temporary   Physical Restrictions   Sitting:    Standing:    Stooping:    Bending:  < or = to 1 hr/day      Squatting:    Walking:    Climbing:    Pushing:      Pulling:    Other:    Reaching Above Shoulder (L):   Reaching Above Shoulder (R):       Reaching Below Shoulder (L):    Reaching Below Shoulder (R):      Not to exceed Weight Limits   Carrying(hrs):   Weight Limit(lb):   Lifting(hrs):   Weight  Limit(lb): < or = to 10 pounds   Comments:      Repetitive Actions   Hands: i.e. Fine Manipulations from Grasping:     Feet: i.e. Operating Foot Controls:     Driving / Operate Machinery:     Physician Name: Farzad Broussard M.D. Physician Signature: FARZAD Cabrera M.D. e-Signature: Dr. Jefry Browning, Medical Director   Clinic Name / Location: [address],   [address] [place] [id number] Clinic Phone Number: Dept: [phone]   Appointment Time: 4:00 Pm Visit Start Time: 4:18 PM   Check-In Time:  4:00 Pm Visit Discharge Time:  4:53pm   Original-Treating Physician or Chiropractor    Page 2-Insurer/TPA    Page 3-Employer    Page 4-Employee

## 2018-02-16 NOTE — PROGRESS NOTES
Subjective:      Bg Edwards is a 57 y.o. male who presents with Follow-Up (WC DOI 1/10/18 Lower back - R Hip - L Hip - Same - ROOM 2)      DOI 1/10/2018. Mechanism of injury-fell forward onto ground and tweaked back. 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 He seems to indicate slight improvement with physical therapy. He continues to have pain best localized to the lateral upper right hip and anterior hip which is worse with certain movements especially right hip flexion. He states that he feels fairly well in the beginning of the day but then has difficulty later with prolonged walking. He is tolerating most of his regular work duties. MRI has been approved but not scheduled.     HPI    ROS  PFSH:  WORK STATUS: Restricted activity  PMH:  has a past medical history of Abnormal EKG; Atrial fibrillation (CMS-HCC); Dyspnea; Hypertension; Pain (6); Paroxysmal atrial fibrillation (CMS-HCC); and Snoring. He also has no past medical history of Allergy; Anemia; Anxiety; CAD (coronary artery disease); Cancer (CMS-HCC); Carotid artery disease (CMS-HCC); CHF (congestive heart failure) (CMS-HCC); COPD; Diabetes; Goiter; Heart attack; Heart murmur; Hyperlipidemia; Kidney disease; PVD (peripheral vascular disease) (CMS-HCC); Stroke (CMS-HCC); or Ulcer (CMS-HCC).  MEDS:   Current Outpatient Prescriptions:   •  nabumetone (RELAFEN) 750 MG Tab, Take 1 Tab by mouth 2 times a day., Disp: 60 Tab, Rfl: 1  •  etodolac (LODINE) 500 MG tablet, Take 1 Tab by mouth 2 times a day., Disp: 60 Tab, Rfl: 1  •  tizanidine (ZANAFLEX) 4 MG Tab, Take 1 Tab by mouth every 6 hours as needed., Disp: 30 Tab, Rfl: 3  •  POTASSIUM PO, Take 1 Cap by mouth every day., Disp: , Rfl:   •  Saw Palmetto, Serenoa repens, (SAW PALMETTO PO), Take 1 Cap by mouth every day., Disp: , Rfl:   •  MAGNESIUM PO, Take 1 Cap by mouth every day., Disp: , Rfl:   •  Thiamine Mononitrate (VITAMIN B1 PO), Take 1 Tab by mouth every day., Disp: , Rfl:   •  MILK  "THISTLE PO, Take 1 Cap by mouth every day., Disp: , Rfl:   •  lisinopril (PRINIVIL) 20 MG Tab, Take 1 Tab by mouth every day for 90 days., Disp: 90 Tab, Rfl: 0  •  lisinopril-hydrochlorothiazide (PRINZIDE, ZESTORETIC) 20-12.5 MG per tablet, Take 1 Tab by mouth every day., Disp: 90 Tab, Rfl: 0  •  metoprolol SR (TOPROL XL) 25 MG TABLET SR 24 HR, Take 1 Tab by mouth every day., Disp: 90 Tab, Rfl: 0       Objective:     /90   Pulse (!) 111   Temp 36.7 °C (98 °F)   Resp 16   Ht 1.956 m (6' 5\")   Wt (!) 147.4 kg (325 lb)   SpO2 95%   BMI 38.54 kg/m²      Physical Exam    Appearance: Well-developed, well-nourished.   Mental Status: Mood and Affect normal. Pleasant. Cooperative. Appropriate.   Musculoskeletal: Back exam shows normal posture. Normal gait. Right hip shows no tenderness. Pain with certain movements especially flexion.         Assessment/Plan:     1. Strain of lumbar region, subsequent encounter  Condition ongoing  Physical therapy in progress  MRI pending  Restricted activity  Recheck in 3 weeks with anticipated physiatry consultation  Will consider further diagnostic studies of the head including x-rays and/or MRI of right hip    "

## 2018-03-07 ENCOUNTER — OCCUPATIONAL MEDICINE (OUTPATIENT)
Dept: OCCUPATIONAL MEDICINE | Facility: CLINIC | Age: 58
End: 2018-03-07
Payer: COMMERCIAL

## 2018-03-07 VITALS
OXYGEN SATURATION: 94 % | TEMPERATURE: 96.7 F | HEIGHT: 66 IN | WEIGHT: 315 LBS | HEART RATE: 100 BPM | BODY MASS INDEX: 50.62 KG/M2 | RESPIRATION RATE: 16 BRPM

## 2018-03-07 DIAGNOSIS — S39.012D STRAIN OF LUMBAR REGION, SUBSEQUENT ENCOUNTER: ICD-10-CM

## 2018-03-07 DIAGNOSIS — M51.26 LUMBAR DISC HERNIATION: ICD-10-CM

## 2018-03-07 DIAGNOSIS — M51.36 DEGENERATIVE DISC DISEASE, LUMBAR: ICD-10-CM

## 2018-03-07 DIAGNOSIS — Z98.890 STATUS POST LAMINECTOMY: ICD-10-CM

## 2018-03-07 PROCEDURE — 99213 OFFICE O/P EST LOW 20 MIN: CPT | Performed by: PREVENTIVE MEDICINE

## 2018-03-07 RX ORDER — METHOCARBAMOL 500 MG/1
1000 TABLET, FILM COATED ORAL 4 TIMES DAILY
Qty: 120 TAB | Refills: 0 | Status: SHIPPED | OUTPATIENT
Start: 2018-03-07 | End: 2018-10-01

## 2018-03-07 ASSESSMENT — PAIN SCALES - GENERAL: PAINLEVEL: 8=MODERATE-SEVERE PAIN

## 2018-03-07 NOTE — LETTER
[address],   [address] [address]  Phone:  [phone] - Fax:  [phone]   [facility] Progress Report and Disability Certification  Date of Service: 3/7/2018   No Show:  No  Date / Time of Next Visit: 4/20/2018 @ 4:15 PM    Claim Information   Patient Name: Bg Edwards  Claim Number:     Employer:   [employer]  Date of Injury: 1/10/2018     Insurer / TPA: [employer]  ID / SSN:     Occupation: construction  Diagnosis: Diagnoses of Strain of lumbar region, subsequent encounter, Lumbar disc herniation, Status post laminectomy, and Degenerative disc disease, lumbar were pertinent to this visit.    Medical Information   Related to Industrial Injury?   Comments:Indeterminate    Subjective Complaints:  DOI 1/10/2018. Mechanism of injury-fell forward onto ground and tweaked back. 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 He still complains of 8 out of 10 pain. He seems to indicate he is well and the morning and has worsening pain through the day. He is requesting alternative medication.   Objective Findings: MRI shows left paracentral disc extrusion at L3-4 impinging on the L4 nerve root, laminectomy changes L4-5 and L5-S1, degenerative changes including bilateral neural foraminal narrowing and central canal stenosis.   Pre-Existing Condition(s): Degenerative disc disease lumbar spine, laminectomy   Assessment:   Condition Same    Status: Additional Care Required  Permanent Disability:No    Plan: Consultation    Diagnostics:      Comments:  Physiatry consultation for epidural steroids    Disability Information   Status: Released to Restricted Duty    From:  3/7/2018  Through: 4/19/2018 Restrictions are: Temporary   Physical Restrictions   Sitting:    Standing:    Stooping:    Bending:  < or = to 1 hr/day   Squatting:    Walking:    Climbing:    Pushing:      Pulling:    Other:    Reaching Above Shoulder (L):    Reaching Above Shoulder (R):       Reaching Below Shoulder (L):    Reaching Below Shoulder (R):      Not to exceed Weight Limits   Carrying(hrs):   Weight Limit(lb):   Lifting(hrs):   Weight  Limit(lb): < or = to 10 pounds   Comments:      Repetitive Actions   Hands: i.e. Fine Manipulations from Grasping:     Feet: i.e. Operating Foot Controls:     Driving / Operate Machinery:     Physician Name: Farzad Broussard M.D. Physician Signature: FARZAD Cabrera M.D. e-Signature: Dr. Jefry Browning, Medical Director   Clinic Name / Location: [address],   [address] [place] [address], [place] [address] Clinic Phone Number: Dept: [phone]   Appointment Time: 4:30 Pm Visit Start Time: 4:36 PM   Check-In Time:  4:35 Pm Visit Discharge Time:  5:20 PM    Original-Treating Physician or Chiropractor    Page 2-Insurer/TPA    Page 3-Employer    Page 4-Employee

## 2018-03-08 NOTE — PROGRESS NOTES
Subjective:      Bg Edwards is a 57 y.o. male who presents with Follow-Up (WC DOI 1/10/18 Lower back - R Hip - L Hip - Better - ROOM 3)      DOI 1/10/2018. Mechanism of injury-fell forward onto ground and tweaked back. 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 He still complains of 8 out of 10 pain. He seems to indicate he is well and the morning and has worsening pain through the day. He is requesting alternative medication.     HPI    ROS  PFSH:  WORK STATUS: Restricted activity  PMH:  has a past medical history of Abnormal EKG; Atrial fibrillation (CMS-[facility]); Dyspnea; Hypertension; Pain (6); Paroxysmal atrial fibrillation (CMS-[facility]); and Snoring. He also has no past medical history of Allergy; Anemia; Anxiety; CAD (coronary artery disease); Cancer (CMS-[facility]); Carotid artery disease (CMS-[facility]); CHF (congestive heart failure) (CMS-HCC); COPD; Diabetes; Goiter; Heart attack; Heart murmur; Hyperlipidemia; Kidney disease; PVD (peripheral vascular disease) (CMS-[facility]); Stroke (CMS-[facility]); or Ulcer (CMS-HCC).  MEDS:   Current Outpatient Prescriptions:   •  methocarbamol (ROBAXIN) 500 MG Tab, Take 2 Tabs by mouth 4 times a day., Disp: 120 Tab, Rfl: 0  •  nabumetone (RELAFEN) 750 MG Tab, Take 1 Tab by mouth 2 times a day., Disp: 60 Tab, Rfl: 1  •  etodolac (LODINE) 500 MG tablet, Take 1 Tab by mouth 2 times a day., Disp: 60 Tab, Rfl: 1  •  tizanidine (ZANAFLEX) 4 MG Tab, Take 1 Tab by mouth every 6 hours as needed., Disp: 30 Tab, Rfl: 3  •  POTASSIUM PO, Take 1 Cap by mouth every day., Disp: , Rfl:   •  Saw Palmetto, Serenoa repens, (SAW PALMETTO PO), Take 1 Cap by mouth every day., Disp: , Rfl:   •  MAGNESIUM PO, Take 1 Cap by mouth every day., Disp: , Rfl:   •  Thiamine Mononitrate (VITAMIN B1 PO), Take 1 Tab by mouth every day., Disp: , Rfl:   •  MILK THISTLE PO, Take 1 Cap by mouth every day., Disp: , Rfl:   •  lisinopril (PRINIVIL) 20 MG Tab, Take 1 Tab by mouth every day for 90 days., Disp: 90 Tab,  "Rfl: 0  •  lisinopril-hydrochlorothiazide (PRINZIDE, ZESTORETIC) 20-12.5 MG per tablet, Take 1 Tab by mouth every day., Disp: 90 Tab, Rfl: 0  •  metoprolol SR (TOPROL XL) 25 MG TABLET SR 24 HR, Take 1 Tab by mouth every day., Disp: 90 Tab, Rfl: 0       Objective:     Pulse 100   Temp 35.9 °C (96.7 °F)   Resp 16   Ht 1.676 m (5' 6\")   Wt (!) 147.4 kg (325 lb)   SpO2 94%   BMI 52.46 kg/m²      Physical Exam    MRI shows left paracentral disc extrusion at L3-4 impinging on the L4 nerve root, laminectomy changes L4-5 and L5-S1, degenerative changes including bilateral neural foraminal narrowing and central canal stenosis.       Assessment/Plan:     1. Strain of lumbar region, subsequent encounter  2. Lumbar disc herniation  3. Status post laminectomy  4. Degenerative disc disease, lumbar  No significant change  - REFERRAL TO PHYSIATRY (PMR)  - methocarbamol (ROBAXIN) 500 MG Tab; Take 2 Tabs by mouth 4 times a day.  Dispense: 120 Tab; Refill: 0  - Restricted activity  - Recheck 6 weeks unless care transferred specialist      "

## 2018-10-01 ENCOUNTER — OFFICE VISIT (OUTPATIENT)
Dept: CARDIOLOGY | Facility: MEDICAL CENTER | Age: 58
End: 2018-10-01
Payer: COMMERCIAL

## 2018-10-01 VITALS
SYSTOLIC BLOOD PRESSURE: 184 MMHG | WEIGHT: 315 LBS | OXYGEN SATURATION: 97 % | HEIGHT: 77 IN | BODY MASS INDEX: 37.19 KG/M2 | DIASTOLIC BLOOD PRESSURE: 102 MMHG | HEART RATE: 92 BPM

## 2018-10-01 DIAGNOSIS — E78.2 MIXED HYPERLIPIDEMIA: ICD-10-CM

## 2018-10-01 DIAGNOSIS — G47.33 OSA (OBSTRUCTIVE SLEEP APNEA): ICD-10-CM

## 2018-10-01 DIAGNOSIS — I10 ESSENTIAL HYPERTENSION: ICD-10-CM

## 2018-10-01 DIAGNOSIS — R94.31 NONSPECIFIC ABNORMAL ELECTROCARDIOGRAM (ECG) (EKG): ICD-10-CM

## 2018-10-01 DIAGNOSIS — I48.91 ATRIAL FIBRILLATION, UNSPECIFIED TYPE (HCC): ICD-10-CM

## 2018-10-01 DIAGNOSIS — I48.0 PAROXYSMAL ATRIAL FIBRILLATION (HCC): ICD-10-CM

## 2018-10-01 DIAGNOSIS — R06.09 DYSPNEA ON EXERTION: ICD-10-CM

## 2018-10-01 DIAGNOSIS — Z01.810 PRE-OPERATIVE CARDIOVASCULAR EXAMINATION: ICD-10-CM

## 2018-10-01 LAB — EKG IMPRESSION: NORMAL

## 2018-10-01 PROCEDURE — 99205 OFFICE O/P NEW HI 60 MIN: CPT | Performed by: INTERNAL MEDICINE

## 2018-10-01 PROCEDURE — 93000 ELECTROCARDIOGRAM COMPLETE: CPT | Performed by: INTERNAL MEDICINE

## 2018-10-01 RX ORDER — AMLODIPINE BESYLATE 10 MG/1
10 TABLET ORAL DAILY
Qty: 30 TAB | Refills: 11 | Status: SHIPPED | OUTPATIENT
Start: 2018-10-01 | End: 2019-10-03 | Stop reason: SDUPTHER

## 2018-10-01 RX ORDER — LISINOPRIL AND HYDROCHLOROTHIAZIDE 20; 12.5 MG/1; MG/1
1 TABLET ORAL DAILY
Qty: 90 TAB | Refills: 0 | Status: SHIPPED | OUTPATIENT
Start: 2018-10-01 | End: 2018-12-27 | Stop reason: SDUPTHER

## 2018-10-01 RX ORDER — LISINOPRIL 20 MG/1
20 TABLET ORAL DAILY
COMMUNITY
End: 2018-10-01

## 2018-10-01 ASSESSMENT — ENCOUNTER SYMPTOMS
COUGH: 0
NEUROLOGICAL NEGATIVE: 1
CONSTITUTIONAL NEGATIVE: 1
WHEEZING: 0
GASTROINTESTINAL NEGATIVE: 1
MUSCULOSKELETAL NEGATIVE: 1
SHORTNESS OF BREATH: 0
EYES NEGATIVE: 1
CLAUDICATION: 0
PND: 0
WEAKNESS: 0
RESPIRATORY NEGATIVE: 1
SORE THROAT: 0
STRIDOR: 0
DIZZINESS: 0
BRUISES/BLEEDS EASILY: 0
CHILLS: 0
LOSS OF CONSCIOUSNESS: 0
PALPITATIONS: 0
HEMOPTYSIS: 0
CARDIOVASCULAR NEGATIVE: 1
SPUTUM PRODUCTION: 0
ORTHOPNEA: 0
FEVER: 0

## 2018-10-01 NOTE — PROGRESS NOTES
Chief Complaint   Patient presents with   • HTN (Controlled)   • Atrial Fibrillation       Subjective:   Bg Edwards is a 58 y.o. male who presents today as a preop for hip replacement surgery.  He is a 58-year-old male with a history of paroxysmal atrial fibrillation hypertension hyperlipidemia.  He has been a couple of years since we have seen him.  He has not had an echo or nuclear stress test.  In terms of his limitations he can walk about 50 feet before he has to stop due to his hip pain.  He does have high blood pressure but has not been taking medications in some time.  His ECG does show evidence of LVH.  He also has sleep apnea but is not been on treatment for sleep apnea in a number of years.  He previously was taking 3 medications for his blood pressure but has been off these for years.    Past Medical History:   Diagnosis Date   • Abnormal EKG    • Atrial fibrillation (HCC)    • Dyspnea    • Hypertension    • Mixed hyperlipidemia 10/1/2018   • Pain 6   • Paroxysmal atrial fibrillation (HCC)    • Snoring      Past Surgical History:   Procedure Laterality Date   • FOREIGN BODY REMOVAL  6/29/2010    Performed by BRI HICKS at SURGERY SAME DAY [facility] ORS   • LUMBAR LAMINECTOMY DISKECTOMY Bilateral    • OTHER ORTHOPEDIC SURGERY  AC RECONSTRUCTION   • OTHER ORTHOPEDIC SURGERY  LEFT THUMB   • TONSILLECTOMY     • VASECTOMY       Family History   Problem Relation Age of Onset   • Heart Attack Father      Social History     Social History   • Marital status:      Spouse name: N/A   • Number of children: N/A   • Years of education: N/A     Occupational History   • Not on file.     Social History Main Topics   • Smoking status: Former Smoker     Types: Cigars   • Smokeless tobacco: Never Used      Comment: 15 YEARS  QUIT IN 95   • Alcohol use 1.5 oz/week     3 Standard drinks or equivalent per week      Comment: weekly    • Drug use: No      Comment: marijuana   • Sexual activity: Not on file       Comment: na     Other Topics Concern   • Not on file     Social History Narrative   • No narrative on file     Allergies   Allergen Reactions   • Nkda [No Known Drug Allergy]      Outpatient Encounter Prescriptions as of 10/1/2018   Medication Sig Dispense Refill   • amLODIPine (NORVASC) 10 MG Tab Take 1 Tab by mouth every day. 30 Tab 11   • lisinopril-hydrochlorothiazide (PRINZIDE, ZESTORETIC) 20-12.5 MG per tablet Take 1 Tab by mouth every day. 90 Tab 0   • etodolac (LODINE) 500 MG tablet Take 1 Tab by mouth 2 times a day. 60 Tab 1   • tizanidine (ZANAFLEX) 4 MG Tab Take 1 Tab by mouth every 6 hours as needed. 30 Tab 3   • [DISCONTINUED] AMLODIPINE BESYLATE PO Take  by mouth.     • [DISCONTINUED] lisinopril (PRINIVIL) 20 MG Tab Take 20 mg by mouth every day.     • [DISCONTINUED] methocarbamol (ROBAXIN) 500 MG Tab Take 2 Tabs by mouth 4 times a day. (Patient not taking: Reported on 10/1/2018) 120 Tab 0   • [DISCONTINUED] nabumetone (RELAFEN) 750 MG Tab Take 1 Tab by mouth 2 times a day. (Patient not taking: Reported on 10/1/2018) 60 Tab 1   • [DISCONTINUED] POTASSIUM PO Take 1 Cap by mouth every day.     • [DISCONTINUED] Saw Palmetto, Serenoa repens, (SAW PALMETTO PO) Take 1 Cap by mouth every day.     • [DISCONTINUED] MAGNESIUM PO Take 1 Cap by mouth every day.     • [DISCONTINUED] Thiamine Mononitrate (VITAMIN B1 PO) Take 1 Tab by mouth every day.     • [DISCONTINUED] MILK THISTLE PO Take 1 Cap by mouth every day.     • [DISCONTINUED] lisinopril-hydrochlorothiazide (PRINZIDE, ZESTORETIC) 20-12.5 MG per tablet Take 1 Tab by mouth every day. (Patient not taking: Reported on 10/1/2018) 90 Tab 0   • [DISCONTINUED] metoprolol SR (TOPROL XL) 25 MG TABLET SR 24 HR Take 1 Tab by mouth every day. (Patient not taking: Reported on 10/1/2018) 90 Tab 0     No facility-administered encounter medications on file as of 10/1/2018.      Review of Systems   Constitutional: Negative.  Negative for chills, fever and  "malaise/fatigue.   HENT: Negative.  Negative for sore throat.    Eyes: Negative.    Respiratory: Negative.  Negative for cough, hemoptysis, sputum production, shortness of breath, wheezing and stridor.    Cardiovascular: Negative.  Negative for chest pain, palpitations, orthopnea, claudication, leg swelling and PND.   Gastrointestinal: Negative.    Genitourinary: Negative.    Musculoskeletal: Negative.    Skin: Negative.    Neurological: Negative.  Negative for dizziness, loss of consciousness and weakness.   Endo/Heme/Allergies: Negative.  Does not bruise/bleed easily.   All other systems reviewed and are negative.       Objective:   BP (!) 184/102 (BP Location: Left arm, Patient Position: Sitting, BP Cuff Size: Large adult)   Pulse 92   Ht 1.956 m (6' 5\")   Wt (!) 162.8 kg (359 lb)   SpO2 97%   BMI 42.57 kg/m²     Physical Exam   Constitutional: He appears well-developed and well-nourished. No distress.   HENT:   Head: Normocephalic and atraumatic.   Right Ear: External ear normal.   Left Ear: External ear normal.   Nose: Nose normal.   Mouth/Throat: No oropharyngeal exudate.   Eyes: Pupils are equal, round, and reactive to light. Conjunctivae and EOM are normal. Right eye exhibits no discharge. Left eye exhibits no discharge. No scleral icterus.   Neck: Neck supple. No JVD present.   Cardiovascular: Normal rate, regular rhythm and intact distal pulses.  Exam reveals no gallop and no friction rub.    No murmur heard.  Pulmonary/Chest: Effort normal. No stridor. No respiratory distress. He has no wheezes. He has no rales. He exhibits no tenderness.   Abdominal: Soft. He exhibits no distension. There is no guarding.   Musculoskeletal: Normal range of motion. He exhibits no edema, tenderness or deformity.   Neurological: He is alert. He has normal reflexes. He displays normal reflexes. No cranial nerve deficit. He exhibits normal muscle tone. Coordination normal.   Skin: Skin is warm and dry. No rash noted. He " is not diaphoretic. No erythema. No pallor.   Psychiatric: He has a normal mood and affect. His behavior is normal. Judgment and thought content normal.   Nursing note and vitals reviewed.    EKG: Personally reviewed by myself showing normal sinus rhythm with a nonspecific intraventricular conduction delay left axis deviation.    Assessment:     1. Essential hypertension  EKG    amLODIPine (NORVASC) 10 MG Tab    lisinopril-hydrochlorothiazide (PRINZIDE, ZESTORETIC) 20-12.5 MG per tablet    BASIC METABOLIC PANEL   2. Paroxysmal atrial fibrillation (HCC)  amLODIPine (NORVASC) 10 MG Tab    LIPID PANEL   3. Mixed hyperlipidemia  LIPID PANEL    BASIC METABOLIC PANEL   4. Atrial fibrillation, unspecified type (HCC)  amLODIPine (NORVASC) 10 MG Tab    lisinopril-hydrochlorothiazide (PRINZIDE, ZESTORETIC) 20-12.5 MG per tablet    NM-CARDIAC PET   5. KIT (obstructive sleep apnea)  REFERRAL TO SLEEP STUDIES    NM-CARDIAC PET   6. Dyspnea on exertion     7. Pre-operative cardiovascular examination  NM-CARDIAC PET   8. Nonspecific abnormal electrocardiogram (ECG) (EKG)         Medical Decision Making:  Today's Assessment / Status / Plan:     58-year-old male for preoperative stratification with hypertension hyperlipidemia obesity and sleep apnea.  We will check a basic metabolic panel as well as lipids.  We will send him for a cardiac PET to get him through surgery.  We will also send him back to his outpatient sleep specialist.  I would only like to start his amlodipine at 10 mg/day and his lisinopril/HCTZ at 20/12.5 today.  I am concerned about body his blood pressure.  I like to see him back in 4 weeks for blood pressure check.    1. Pre-op    -cardiac PET    2. HTN    - start amlo 10    - start lisin/HCTZ 20/12.5    - BMP    3. HLD    - lipids    4. KIT    - refer to Sleep Med    Thank for you allowing me to take part in your patient's care, please call should you have any questions or would like to discuss this patient.

## 2018-10-05 ENCOUNTER — HOSPITAL ENCOUNTER (OUTPATIENT)
Dept: LAB | Facility: MEDICAL CENTER | Age: 58
End: 2018-10-05
Attending: INTERNAL MEDICINE
Payer: COMMERCIAL

## 2018-10-05 DIAGNOSIS — I10 ESSENTIAL HYPERTENSION: ICD-10-CM

## 2018-10-05 DIAGNOSIS — E78.2 MIXED HYPERLIPIDEMIA: ICD-10-CM

## 2018-10-05 LAB
ANION GAP SERPL CALC-SCNC: 10 MMOL/L (ref 0–11.9)
BUN SERPL-MCNC: 20 MG/DL (ref 8–22)
CALCIUM SERPL-MCNC: 10.1 MG/DL (ref 8.5–10.5)
CHLORIDE SERPL-SCNC: 100 MMOL/L (ref 96–112)
CHOLEST SERPL-MCNC: 180 MG/DL (ref 100–199)
CO2 SERPL-SCNC: 27 MMOL/L (ref 20–33)
CREAT SERPL-MCNC: 1.02 MG/DL (ref 0.5–1.4)
FASTING STATUS PATIENT QL REPORTED: NORMAL
GLUCOSE SERPL-MCNC: 145 MG/DL (ref 65–99)
HDLC SERPL-MCNC: 32 MG/DL
LDLC SERPL CALC-MCNC: 117 MG/DL
POTASSIUM SERPL-SCNC: 3.9 MMOL/L (ref 3.6–5.5)
SODIUM SERPL-SCNC: 137 MMOL/L (ref 135–145)
TRIGL SERPL-MCNC: 157 MG/DL (ref 0–149)

## 2018-10-05 PROCEDURE — 80048 BASIC METABOLIC PNL TOTAL CA: CPT

## 2018-10-05 PROCEDURE — 80061 LIPID PANEL: CPT

## 2018-10-05 PROCEDURE — 36415 COLL VENOUS BLD VENIPUNCTURE: CPT

## 2018-10-10 ENCOUNTER — HOSPITAL ENCOUNTER (OUTPATIENT)
Dept: RADIOLOGY | Facility: MEDICAL CENTER | Age: 58
End: 2018-10-10
Attending: INTERNAL MEDICINE
Payer: COMMERCIAL

## 2018-10-10 DIAGNOSIS — Z01.810 PRE-OPERATIVE CARDIOVASCULAR EXAMINATION: ICD-10-CM

## 2018-10-10 DIAGNOSIS — I48.91 ATRIAL FIBRILLATION, UNSPECIFIED TYPE (HCC): ICD-10-CM

## 2018-10-10 DIAGNOSIS — G47.33 OSA (OBSTRUCTIVE SLEEP APNEA): ICD-10-CM

## 2018-10-10 PROCEDURE — A9555 RB82 RUBIDIUM: HCPCS

## 2018-10-10 PROCEDURE — 93018 CV STRESS TEST I&R ONLY: CPT | Performed by: INTERNAL MEDICINE

## 2018-10-10 PROCEDURE — 78492 MYOCRD IMG PET MLT RST&STRS: CPT | Mod: 26 | Performed by: INTERNAL MEDICINE

## 2018-10-11 NOTE — PROCEDURES
REFERRING PHYSICIAN:  Bg Jurado MD    AGE:  58.  GENDER:  MALE.  HEIGHT:  77 inches.  WEIGHT:  350 pounds.    INDICATION:  Atrial fibrillation.    PROCEDURE:  The patient reviewed and signed the acknowledgement for testing   form.  The patient was in a fasting state and was properly prepared for   testing.  An intravenous line was inserted and a flush of normal saline   followed to insure line patency.    A transmission scan was acquired for attenuation correction using the internal   Germanium sources.  The patient was then administered 30.2 mCi of   Rubidium-82.  Approximately 90 seconds after the infusion, resting imagine   were obtained with ECG-gating.  Following the resting series, the patient   administered 60 mg of dipyridamole over four minutes.  The blood pressure,   heart rate and ECG were monitored and recorded.  After the dipyridamole   infusion was completed, another transmission scan for attenuation correction   was obtained.  The patient was then administered 30.2 mCi of Rubidium-82.    Approximately 90 seconds after the infusion, Peak stress images were obtained   with ECG-gating.    CLINICAL RESPONSE:  Resting blood pressure was 124/78 mmHg with a heart rate   of 82 beats per minute.  Immediately post-dipyridamole infusion the blood   pressure was 104/78 mmHg with a heart rate of 89 beats per minute.  After a   recovery period the blood pressure was 145/93 mmHg with a heart rate of 90   beats per minute.    The patient experienced chest tightness, headache, and nausea during testing.    Aminophylline 0 mg was administered following the scan.    ELECTROCARDIOGRAPHIC FINDINGS:  Rest EKG shows sinus rhythm with nonspecific   intraventricular conduction delay.  Stress EKG shows no significant ST segment   changes.    SCINTOGRAPHIC FINDINGS:  Both rest and stress images show diffuse patchy   uptake without significant focal attenuation.  TID 1.24.    GATED WALL MOTION FINDINGS:  Resting  ejection fraction of 52%, stress ejection   fraction 57%.  No segmental wall motion abnormalities noted.    IMPRESSIONS:  1.  Normal cardiac PET scan.  No evidence of ischemia nor infarct though   diffuse patchy uptake noted.  Elevated TID of 1.24, clinical correlation   recommended.  2.  Resting ejection fraction 52%, stress ejection fraction 57%, no segmental   wall motion abnormalities noted.  3.  No significant ST segment changes nor arrhythmias.  4.  Unquantitated chest tightness was experienced during the study.       ____________________________________     MD JONO BUTTERFIELD / KAL    DD:  10/10/2018 16:08:22  DT:  10/10/2018 18:54:10    D#:  [id number]  Job#:  [id number]

## 2018-10-29 ENCOUNTER — OFFICE VISIT (OUTPATIENT)
Dept: CARDIOLOGY | Facility: MEDICAL CENTER | Age: 58
End: 2018-10-29
Payer: COMMERCIAL

## 2018-10-29 VITALS
BODY MASS INDEX: 37.19 KG/M2 | WEIGHT: 315 LBS | HEART RATE: 94 BPM | SYSTOLIC BLOOD PRESSURE: 138 MMHG | OXYGEN SATURATION: 94 % | DIASTOLIC BLOOD PRESSURE: 88 MMHG | HEIGHT: 77 IN

## 2018-10-29 DIAGNOSIS — I48.0 PAROXYSMAL ATRIAL FIBRILLATION (HCC): ICD-10-CM

## 2018-10-29 DIAGNOSIS — G47.33 OSA (OBSTRUCTIVE SLEEP APNEA): ICD-10-CM

## 2018-10-29 DIAGNOSIS — E66.01 MORBID OBESITY WITH BMI OF 40.0-44.9, ADULT (HCC): ICD-10-CM

## 2018-10-29 DIAGNOSIS — Z01.810 PRE-OPERATIVE CARDIOVASCULAR EXAMINATION: ICD-10-CM

## 2018-10-29 DIAGNOSIS — E78.2 MIXED HYPERLIPIDEMIA: ICD-10-CM

## 2018-10-29 DIAGNOSIS — I10 ESSENTIAL HYPERTENSION: ICD-10-CM

## 2018-10-29 DIAGNOSIS — R06.02 SOB (SHORTNESS OF BREATH): ICD-10-CM

## 2018-10-29 DIAGNOSIS — R94.31 NONSPECIFIC ABNORMAL ELECTROCARDIOGRAM (ECG) (EKG): ICD-10-CM

## 2018-10-29 DIAGNOSIS — R73.01 ELEVATED FASTING BLOOD SUGAR: ICD-10-CM

## 2018-10-29 PROCEDURE — 99215 OFFICE O/P EST HI 40 MIN: CPT | Performed by: INTERNAL MEDICINE

## 2018-10-29 ASSESSMENT — ENCOUNTER SYMPTOMS
COUGH: 0
DIZZINESS: 0
EYES NEGATIVE: 1
WHEEZING: 0
BRUISES/BLEEDS EASILY: 0
PND: 0
FEVER: 0
GASTROINTESTINAL NEGATIVE: 1
CLAUDICATION: 0
SORE THROAT: 0
CONSTITUTIONAL NEGATIVE: 1
LOSS OF CONSCIOUSNESS: 0
CHILLS: 0
CARDIOVASCULAR NEGATIVE: 1
PALPITATIONS: 0
RESPIRATORY NEGATIVE: 1
NEUROLOGICAL NEGATIVE: 1
STRIDOR: 0
SPUTUM PRODUCTION: 0
ORTHOPNEA: 0
SHORTNESS OF BREATH: 0
WEAKNESS: 0
MUSCULOSKELETAL NEGATIVE: 1
HEMOPTYSIS: 0

## 2018-10-29 NOTE — PROGRESS NOTES
Chief Complaint   Patient presents with   • Hypertension       Subjective:   Bg Edwards is a 58 y.o. male who presents today as a follow-up for his high blood pressure.  His blood pressure is now under much better control.  We did send him for some blood work which showed an elevated blood sugar of 146.  He was fasting for at least 12 hours at the time.  His lipids are also significant for an LDL of 117 with a triglyceride of 157.  Is not getting any chest pain or shortness of breath.  His nuclear stress test that we did for preop risk stratification was normal with no ischemia but did show an elevated TID.  Functionally he can walk but is limited by hip pain.  He can very likely complete greater than 4 METs.    Past Medical History:   Diagnosis Date   • Abnormal EKG    • Atrial fibrillation (HCC)    • Dyspnea    • Hypertension    • Mixed hyperlipidemia 10/1/2018   • Pain 6   • Paroxysmal atrial fibrillation (HCC)    • Snoring      Past Surgical History:   Procedure Laterality Date   • FOREIGN BODY REMOVAL  6/29/2010    Performed by BRI HICKS at SURGERY SAME DAY [facility]S   • LUMBAR LAMINECTOMY DISKECTOMY Bilateral    • OTHER ORTHOPEDIC SURGERY  AC RECONSTRUCTION   • OTHER ORTHOPEDIC SURGERY  LEFT THUMB   • TONSILLECTOMY     • VASECTOMY       Family History   Problem Relation Age of Onset   • Heart Attack Father      Social History     Social History   • Marital status:      Spouse name: N/A   • Number of children: N/A   • Years of education: N/A     Occupational History   • Not on file.     Social History Main Topics   • Smoking status: Former Smoker     Types: Cigars   • Smokeless tobacco: Never Used      Comment: 15 YEARS  QUIT IN 95   • Alcohol use 1.5 oz/week     3 Standard drinks or equivalent per week      Comment: weekly    • Drug use: No      Comment: marijuana   • Sexual activity: Not on file      Comment: na     Other Topics Concern   • Not on file     Social History  "Narrative   • No narrative on file     Allergies   Allergen Reactions   • Nkda [No Known Drug Allergy]      Outpatient Encounter Prescriptions as of 10/29/2018   Medication Sig Dispense Refill   • amLODIPine (NORVASC) 10 MG Tab Take 1 Tab by mouth every day. 30 Tab 11   • lisinopril-hydrochlorothiazide (PRINZIDE, ZESTORETIC) 20-12.5 MG per tablet Take 1 Tab by mouth every day. 90 Tab 0   • tizanidine (ZANAFLEX) 4 MG Tab Take 1 Tab by mouth every 6 hours as needed. 30 Tab 3   • [DISCONTINUED] etodolac (LODINE) 500 MG tablet Take 1 Tab by mouth 2 times a day. (Patient not taking: Reported on 10/29/2018) 60 Tab 1     No facility-administered encounter medications on file as of 10/29/2018.      Review of Systems   Constitutional: Negative.  Negative for chills, fever and malaise/fatigue.   HENT: Negative.  Negative for sore throat.    Eyes: Negative.    Respiratory: Negative.  Negative for cough, hemoptysis, sputum production, shortness of breath, wheezing and stridor.    Cardiovascular: Negative.  Negative for chest pain, palpitations, orthopnea, claudication, leg swelling and PND.   Gastrointestinal: Negative.    Genitourinary: Negative.    Musculoskeletal: Negative.    Skin: Negative.    Neurological: Negative.  Negative for dizziness, loss of consciousness and weakness.   Endo/Heme/Allergies: Negative.  Does not bruise/bleed easily.   All other systems reviewed and are negative.       Objective:   /88 (BP Location: Right arm, Patient Position: Sitting, BP Cuff Size: Adult)   Pulse 94   Ht 1.956 m (6' 5\")   Wt (!) 161.5 kg (356 lb)   SpO2 94%   BMI 42.22 kg/m²     Physical Exam   Constitutional: He appears well-developed and well-nourished. No distress.   HENT:   Head: Normocephalic and atraumatic.   Right Ear: External ear normal.   Left Ear: External ear normal.   Nose: Nose normal.   Mouth/Throat: No oropharyngeal exudate.   Eyes: Pupils are equal, round, and reactive to light. Conjunctivae and EOM are " normal. Right eye exhibits no discharge. Left eye exhibits no discharge. No scleral icterus.   Neck: Neck supple. No JVD present.   Cardiovascular: Normal rate, regular rhythm and intact distal pulses.  Exam reveals no gallop and no friction rub.    No murmur heard.  Pulmonary/Chest: Effort normal. No stridor. No respiratory distress. He has no wheezes. He has no rales. He exhibits no tenderness.   Abdominal: Soft. He exhibits no distension. There is no guarding.   Musculoskeletal: Normal range of motion. He exhibits no edema, tenderness or deformity.   Neurological: He is alert. He has normal reflexes. He displays normal reflexes. No cranial nerve deficit. He exhibits normal muscle tone. Coordination normal.   Skin: Skin is warm and dry. No rash noted. He is not diaphoretic. No erythema. No pallor.   Psychiatric: He has a normal mood and affect. His behavior is normal. Judgment and thought content normal.   Nursing note and vitals reviewed.    Cardiac PET: 11/10/2018  IMPRESSIONS:  1.  Normal cardiac PET scan.  No evidence of ischemia nor infarct though   diffuse patchy uptake noted.  Elevated TID of 1.24, clinical correlation   recommended.  2.  Resting ejection fraction 52%, stress ejection fraction 57%, no segmental   wall motion abnormalities noted.  3.  No significant ST segment changes nor arrhythmias.  4.  Unquantitated chest tightness was experienced during the study.    Lab Results   Component Value Date/Time    CHOLSTRLTOT 180 10/05/2018 09:33 AM     (H) 10/05/2018 09:33 AM    HDL 32 (A) 10/05/2018 09:33 AM    TRIGLYCERIDE 157 (H) 10/05/2018 09:33 AM       Lab Results   Component Value Date/Time    SODIUM 137 10/05/2018 09:33 AM    POTASSIUM 3.9 10/05/2018 09:33 AM    CHLORIDE 100 10/05/2018 09:33 AM    CO2 27 10/05/2018 09:33 AM    GLUCOSE 145 (H) 10/05/2018 09:33 AM    BUN 20 10/05/2018 09:33 AM    CREATININE 1.02 10/05/2018 09:33 AM    CREATININE 1.1 06/02/2007 02:50 PM     Lab Results    Component Value Date/Time    ALKPHOSPHAT 92 06/06/2016 07:00 AM    ASTSGOT 21 06/06/2016 07:00 AM    ALTSGPT 28 06/06/2016 07:00 AM    TBILIRUBIN 0.6 06/06/2016 07:00 AM        Assessment:     1. Mixed hyperlipidemia     2. Essential hypertension     3. Paroxysmal atrial fibrillation (HCC)     4. Morbid obesity with BMI of 40.0-44.9, adult (HCC)     5. Nonspecific abnormal electrocardiogram (ECG) (EKG)     6. KIT (obstructive sleep apnea)     7. SOB (shortness of breath)     8. Pre-operative cardiovascular examination     9. Elevated fasting blood sugar  HEMOGLOBIN A1C (Glycohemoglobin GHB Total/A1C with MBG Estimate)    MICROALBUMIN CREAT RATIO URINE RANDOM       Medical Decision Making:  Today's Assessment / Status / Plan:     58-year-old male for preoperative stratification prior to a hip replacement surgery.  We did discuss his elevated transient ischemic dilatation.  I do not think given his symptoms that this is a true finding.  I did offer him an angiogram which she has declined at this point.  In the meantime for his elevated blood sugar I will send him for an A1c I referred him to our list of primary care's.  We will see him back in 3 months.    1. Pre-op    - neg nuclear test, elevated RID, declines Cath    - moderate risk    2. Elevated Fasting BS    - likely T2DM    - Hb A1C pending    - UA pending    3. HTN    - cont meds    4. HLD/Trig    - likely start meds at next visit    Thank for you allowing me to take part in your patient's care, please call should you have any questions or would like to discuss this patient.

## 2018-10-29 NOTE — LETTER
[facility]   [address] [place] [address]  MICHAEL Gómez [address]  Phone: [phone]  Fax: [phone]              Bg Edwards  1960    Encounter Date: 10/29/2018    Bg Jurado M.D.          PROGRESS NOTE:  Chief Complaint   Patient presents with   • Hypertension       Subjective:   Bg Edwards is a 58 y.o. male who presents today as a follow-up for his high blood pressure.  His blood pressure is now under much better control.  We did send him for some blood work which showed an elevated blood sugar of 146.  He was fasting for at least 12 hours at the time.  His lipids are also significant for an LDL of 117 with a triglyceride of 157.  Is not getting any chest pain or shortness of breath.  His nuclear stress test that we did for preop risk stratification was normal with no ischemia but did show an elevated TID.  Functionally he can walk but is limited by hip pain.  He can very likely complete greater than 4 METs.    Past Medical History:   Diagnosis Date   • Abnormal EKG    • Atrial fibrillation (HCC)    • Dyspnea    • Hypertension    • Mixed hyperlipidemia 10/1/2018   • Pain 6   • Paroxysmal atrial fibrillation (HCC)    • Snoring      Past Surgical History:   Procedure Laterality Date   • FOREIGN BODY REMOVAL  6/29/2010    Performed by BRI HICKS at SURGERY SAME DAY [facility]   • LUMBAR LAMINECTOMY DISKECTOMY Bilateral    • OTHER ORTHOPEDIC SURGERY  AC RECONSTRUCTION   • OTHER ORTHOPEDIC SURGERY  LEFT THUMB   • TONSILLECTOMY     • VASECTOMY       Family History   Problem Relation Age of Onset   • Heart Attack Father      Social History     Social History   • Marital status:      Spouse name: N/A   • Number of children: N/A   • Years of education: N/A     Occupational History   • Not on file.     Social History Main Topics   • Smoking status: Former Smoker     Types: Cigars   • Smokeless tobacco: Never Used    "Comment: 15 YEARS  QUIT IN 95   • Alcohol use 1.5 oz/week     3 Standard drinks or equivalent per week      Comment: weekly    • Drug use: No      Comment: marijuana   • Sexual activity: Not on file      Comment: na     Other Topics Concern   • Not on file     Social History Narrative   • No narrative on file     Allergies   Allergen Reactions   • Nkda [No Known Drug Allergy]      Outpatient Encounter Prescriptions as of 10/29/2018   Medication Sig Dispense Refill   • amLODIPine (NORVASC) 10 MG Tab Take 1 Tab by mouth every day. 30 Tab 11   • lisinopril-hydrochlorothiazide (PRINZIDE, ZESTORETIC) 20-12.5 MG per tablet Take 1 Tab by mouth every day. 90 Tab 0   • tizanidine (ZANAFLEX) 4 MG Tab Take 1 Tab by mouth every 6 hours as needed. 30 Tab 3   • [DISCONTINUED] etodolac (LODINE) 500 MG tablet Take 1 Tab by mouth 2 times a day. (Patient not taking: Reported on 10/29/2018) 60 Tab 1     No facility-administered encounter medications on file as of 10/29/2018.      Review of Systems   Constitutional: Negative.  Negative for chills, fever and malaise/fatigue.   HENT: Negative.  Negative for sore throat.    Eyes: Negative.    Respiratory: Negative.  Negative for cough, hemoptysis, sputum production, shortness of breath, wheezing and stridor.    Cardiovascular: Negative.  Negative for chest pain, palpitations, orthopnea, claudication, leg swelling and PND.   Gastrointestinal: Negative.    Genitourinary: Negative.    Musculoskeletal: Negative.    Skin: Negative.    Neurological: Negative.  Negative for dizziness, loss of consciousness and weakness.   Endo/Heme/Allergies: Negative.  Does not bruise/bleed easily.   All other systems reviewed and are negative.       Objective:   /88 (BP Location: Right arm, Patient Position: Sitting, BP Cuff Size: Adult)   Pulse 94   Ht 1.956 m (6' 5\")   Wt (!) 161.5 kg (356 lb)   SpO2 94%   BMI 42.22 kg/m²      Physical Exam   Constitutional: He appears well-developed and " well-nourished. No distress.   HENT:   Head: Normocephalic and atraumatic.   Right Ear: External ear normal.   Left Ear: External ear normal.   Nose: Nose normal.   Mouth/Throat: No oropharyngeal exudate.   Eyes: Pupils are equal, round, and reactive to light. Conjunctivae and EOM are normal. Right eye exhibits no discharge. Left eye exhibits no discharge. No scleral icterus.   Neck: Neck supple. No JVD present.   Cardiovascular: Normal rate, regular rhythm and intact distal pulses.  Exam reveals no gallop and no friction rub.    No murmur heard.  Pulmonary/Chest: Effort normal. No stridor. No respiratory distress. He has no wheezes. He has no rales. He exhibits no tenderness.   Abdominal: Soft. He exhibits no distension. There is no guarding.   Musculoskeletal: Normal range of motion. He exhibits no edema, tenderness or deformity.   Neurological: He is alert. He has normal reflexes. He displays normal reflexes. No cranial nerve deficit. He exhibits normal muscle tone. Coordination normal.   Skin: Skin is warm and dry. No rash noted. He is not diaphoretic. No erythema. No pallor.   Psychiatric: He has a normal mood and affect. His behavior is normal. Judgment and thought content normal.   Nursing note and vitals reviewed.    Cardiac PET: 11/10/2018  IMPRESSIONS:  1.  Normal cardiac PET scan.  No evidence of ischemia nor infarct though   diffuse patchy uptake noted.  Elevated TID of 1.24, clinical correlation   recommended.  2.  Resting ejection fraction 52%, stress ejection fraction 57%, no segmental   wall motion abnormalities noted.  3.  No significant ST segment changes nor arrhythmias.  4.  Unquantitated chest tightness was experienced during the study.    Lab Results   Component Value Date/Time    CHOLSTRLTOT 180 10/05/2018 09:33 AM     (H) 10/05/2018 09:33 AM    HDL 32 (A) 10/05/2018 09:33 AM    TRIGLYCERIDE 157 (H) 10/05/2018 09:33 AM       Lab Results   Component Value Date/Time    SODIUM 137  10/05/2018 09:33 AM    POTASSIUM 3.9 10/05/2018 09:33 AM    CHLORIDE 100 10/05/2018 09:33 AM    CO2 27 10/05/2018 09:33 AM    GLUCOSE 145 (H) 10/05/2018 09:33 AM    BUN 20 10/05/2018 09:33 AM    CREATININE 1.02 10/05/2018 09:33 AM    CREATININE 1.1 06/02/2007 02:50 PM     Lab Results   Component Value Date/Time    ALKPHOSPHAT 92 06/06/2016 07:00 AM    ASTSGOT 21 06/06/2016 07:00 AM    ALTSGPT 28 06/06/2016 07:00 AM    TBILIRUBIN 0.6 06/06/2016 07:00 AM        Assessment:     1. Mixed hyperlipidemia     2. Essential hypertension     3. Paroxysmal atrial fibrillation (HCC)     4. Morbid obesity with BMI of 40.0-44.9, adult (HCC)     5. Nonspecific abnormal electrocardiogram (ECG) (EKG)     6. KIT (obstructive sleep apnea)     7. SOB (shortness of breath)     8. Pre-operative cardiovascular examination     9. Elevated fasting blood sugar  HEMOGLOBIN A1C (Glycohemoglobin GHB Total/A1C with MBG Estimate)    MICROALBUMIN CREAT RATIO URINE RANDOM       Medical Decision Making:  Today's Assessment / Status / Plan:     58-year-old male for preoperative stratification prior to a hip replacement surgery.  We did discuss his elevated transient ischemic dilatation.  I do not think given his symptoms that this is a true finding.  I did offer him an angiogram which she has declined at this point.  In the meantime for his elevated blood sugar I will send him for an A1c I referred him to our list of primary care's.  We will see him back in 3 months.    1. Pre-op    - neg nuclear test, elevated RID, declines Cath    - moderate risk    2. Elevated Fasting BS    - likely T2DM    - Hb A1C pending    - UA pending    3. HTN    - cont meds    4. HLD/Trig    - likely start meds at next visit    Thank for you allowing me to take part in your patient's care, please call should you have any questions or would like to discuss this patient.      No Recipients

## 2018-10-30 ENCOUNTER — HOSPITAL ENCOUNTER (OUTPATIENT)
Dept: LAB | Facility: MEDICAL CENTER | Age: 58
End: 2018-10-30
Attending: INTERNAL MEDICINE
Payer: COMMERCIAL

## 2018-10-30 DIAGNOSIS — R73.01 ELEVATED FASTING BLOOD SUGAR: ICD-10-CM

## 2018-10-30 LAB
CREAT UR-MCNC: 79.2 MG/DL
EST. AVERAGE GLUCOSE BLD GHB EST-MCNC: 177 MG/DL
HBA1C MFR BLD: 7.8 % (ref 0–5.6)
MICROALBUMIN UR-MCNC: <0.7 MG/DL
MICROALBUMIN/CREAT UR: NORMAL MG/G (ref 0–30)

## 2018-10-30 PROCEDURE — 36415 COLL VENOUS BLD VENIPUNCTURE: CPT

## 2018-10-30 PROCEDURE — 82570 ASSAY OF URINE CREATININE: CPT

## 2018-10-30 PROCEDURE — 82043 UR ALBUMIN QUANTITATIVE: CPT

## 2018-10-30 PROCEDURE — 83036 HEMOGLOBIN GLYCOSYLATED A1C: CPT

## 2018-11-17 ENCOUNTER — TELEPHONE (OUTPATIENT)
Dept: URGENT CARE | Facility: PHYSICIAN GROUP | Age: 58
End: 2018-11-17

## 2018-11-17 NOTE — TELEPHONE ENCOUNTER
REASON FOR VISIT: Pre-Op Consultation  Consultation Requested by: [facility]  Procedure date and type: LAURENT, 12/4/18    Current chronic conditions: HTN, new onset DMII    Past medical history:  has a past medical history of Abnormal EKG; Atrial fibrillation (HCC); Dyspnea; Hypertension; Mixed hyperlipidemia (10/1/2018); Pain (6); Paroxysmal atrial fibrillation (HCC); and Snoring. He also has no past medical history of Allergy; Anemia; Anxiety; CAD (coronary artery disease); Cancer (HCC); Carotid artery disease (HCC); CHF (congestive heart failure) (HCC); COPD; Diabetes; Goiter; Heart attack (HCC); Heart murmur; Kidney disease; PVD (peripheral vascular disease) (HCC); Stroke (HCC); or Ulcer.. Negative for: CAD, SBE, CVA, TIA, DVT, PE, bleeding requiring transfusion, intubation.  Surgical and anesthetic history:  has a past surgical history that includes tonsillectomy; vasectomy; other orthopedic surgery (AC RECONSTRUCTION); other orthopedic surgery (LEFT THUMB); foreign body removal (6/29/2010); and lumbar laminectomy diskectomy (Bilateral). Prior surgery without complication, bleeding, reaction to anesthetic, prolonged recovery  Habits:   Social History   Substance Use Topics   • Smoking status: Former Smoker     Types: Cigars   • Smokeless tobacco: Never Used      Comment: 15 YEARS  QUIT IN 95   • Alcohol use 1.5 oz/week     3 Standard drinks or equivalent per week      Comment: weekly      Allergies: Nkda [no known drug allergy] No known allergy to Anesthetic, or Latex.     Current medicines:   Current Outpatient Prescriptions   Medication Sig Dispense Refill   • amLODIPine (NORVASC) 10 MG Tab Take 1 Tab by mouth every day. 30 Tab 11   • lisinopril-hydrochlorothiazide (PRINZIDE, ZESTORETIC) 20-12.5 MG per tablet Take 1 Tab by mouth every day. 90 Tab 0   • tizanidine (ZANAFLEX) 4 MG Tab Take 1 Tab by mouth every 6 hours as needed. 30 Tab 3     No current facility-administered medications for this visit.  "     Anticoagulant: will stop prior to surgery       ROS: Constitutional ROS: No weakness, No fatigue, No unexplained fevers, sweats, or chills  Eye ROS: No recent significant change in vision, No eye pain, redness, discharge  Ear ROS: No tinnitus or vertigo  Mouth/Throat ROS: No teeth or gum problems, No bleeding gums, No tongue complaints  Neck ROS: No swollen glands, No recent swelling in thyroid area, No significant pain in neck  Pulmonary ROS: No chronic cough, sputum, or hemoptysis, No wheezing, No shortness of breath  Cardiovascular ROS: No dyspnea on exertion, No cyanosis, No edema, No palpitations, No syncope  Gastrointestinal ROS: No change in bowel habits, No significant change in appetite, No nausea, vomiting, diarrhea, or constipation, No hematemesis  Musculoskeletal/Extremities ROS: No clubbing, No cyanosis, No peripheral edema  Hematologic/Lymphatic ROS: No abnormal bleeding, No chills, No bruising, No HIV risk factors, No night sweats  Skin/Integumentary ROS: No abnormal skin lesions noted, No edema, No evidence of rash, No itching  Neurologic ROS: No chronic headaches, No seizures, No weakness    Functional Status: ambulatory     PHYSICAL EXAMINATION: 132/85, HR 90, 6'5\", 352#, SpO2 96% RA,   VITAL SIGNS: HEENT: EOMI, PERRL. Oropharynx pink, moist. Normal airway. Neck supple, no cervical lymphadenopathy.  LUNGS: CTAB good excursion.   HEART: RRR no murmur.  ABDOMEN: soft, nondistended, nontender, normal BS. No HSM.  LOWER EXTREMITIES: warm and well perfused with no edema, no cyanosis.    Labs: Results reviewed and discussed with the patient, questions answered.  Component      Latest Ref Rng & Units 10/5/2018 10/5/2018 10/5/2018 10/5/2018           9:32 AM  9:33 AM  9:33 AM  9:33 AM   Sodium      135 - 145 mmol/L  137     Potassium      3.6 - 5.5 mmol/L  3.9     Chloride      96 - 112 mmol/L  100     Co2      20 - 33 mmol/L  27     Glucose      65 - 99 mg/dL  145 (H)     Bun      8 - 22 mg/dL  20   "   Creatinine      0.50 - 1.40 mg/dL  1.02     Calcium      8.5 - 10.5 mg/dL  10.1     Anion Gap      0.0 - 11.9  10.0     Cholesterol,Tot      100 - 199 mg/dL   180    Triglycerides      0 - 149 mg/dL   157 (H)    HDL      >=40 mg/dL   32 (A)    LDL      <100 mg/dL   117 (H)    Creatinine, Urine      mg/dL       Microalbumin, Urine Random      mg/dL       Micro Alb Creat Ratio      0 - 30 mg/g       GFR If African American      >60 mL/min/1.73 m 2    >60   GFR If Non African American      >60 mL/min/1.73 m 2    >60   Glycohemoglobin      0.0 - 5.6 %       Estim. Avg Glu      mg/dL       Fasting Status       Fasting        Component      Latest Ref Rng & Units 10/30/2018 10/30/2018          10:50 AM 10:51 AM   Sodium      135 - 145 mmol/L     Potassium      3.6 - 5.5 mmol/L     Chloride      96 - 112 mmol/L     Co2      20 - 33 mmol/L     Glucose      65 - 99 mg/dL     Bun      8 - 22 mg/dL     Creatinine      0.50 - 1.40 mg/dL     Calcium      8.5 - 10.5 mg/dL     Anion Gap      0.0 - 11.9     Cholesterol,Tot      100 - 199 mg/dL     Triglycerides      0 - 149 mg/dL     HDL      >=40 mg/dL     LDL      <100 mg/dL     Creatinine, Urine      mg/dL 79.20    Microalbumin, Urine Random      mg/dL <0.7    Micro Alb Creat Ratio      0 - 30 mg/g see below    GFR If African American      >60 mL/min/1.73 m 2     GFR If Non African American      >60 mL/min/1.73 m 2     Glycohemoglobin      0.0 - 5.6 %  7.8 (H)   Estim. Avg Glu      mg/dL  177   Fasting Status                 IMPRESSION:  1. PreOp Clearance.  2. New onset type II diabetes  3. Mixed hyperlipidemia  4. Essential HTN  5. Paroxysmal atrial fibrillation  6. Morbid obesity with BMI of 40.0-44.9  7. KIT    PLAN:  1. Chronic medical conditions: patient will establish and follow up with a PCP for diabetes management.  2. Avoid drugs that potentiate bleeding as advised by surgeon  3. This patient is considered: moderate risk for cardiopulmonary complications for this  planned surgery as per cardiology where he received clearance 10/29/18.

## 2018-12-20 ENCOUNTER — SLEEP CENTER VISIT (OUTPATIENT)
Dept: SLEEP MEDICINE | Facility: MEDICAL CENTER | Age: 58
End: 2018-12-20
Payer: COMMERCIAL

## 2018-12-20 VITALS
OXYGEN SATURATION: 92 % | WEIGHT: 315 LBS | BODY MASS INDEX: 37.19 KG/M2 | HEIGHT: 77 IN | RESPIRATION RATE: 16 BRPM | DIASTOLIC BLOOD PRESSURE: 82 MMHG | HEART RATE: 95 BPM | SYSTOLIC BLOOD PRESSURE: 132 MMHG

## 2018-12-20 DIAGNOSIS — E66.01 MORBID OBESITY WITH BMI OF 40.0-44.9, ADULT (HCC): ICD-10-CM

## 2018-12-20 DIAGNOSIS — I48.0 PAROXYSMAL ATRIAL FIBRILLATION (HCC): ICD-10-CM

## 2018-12-20 DIAGNOSIS — G47.33 OSA (OBSTRUCTIVE SLEEP APNEA): ICD-10-CM

## 2018-12-20 PROCEDURE — 99204 OFFICE O/P NEW MOD 45 MIN: CPT | Performed by: NURSE PRACTITIONER

## 2018-12-20 RX ORDER — AMOXICILLIN 500 MG/1
CAPSULE ORAL
COMMUNITY
Start: 2018-11-29 | End: 2018-12-19

## 2018-12-20 RX ORDER — HYDROCODONE BITARTRATE AND ACETAMINOPHEN 5; 325 MG/1; MG/1
TABLET ORAL
COMMUNITY
Start: 2018-12-03 | End: 2018-12-19

## 2018-12-20 ASSESSMENT — ENCOUNTER SYMPTOMS
SENSORY CHANGE: 0
BRUISES/BLEEDS EASILY: 0
FEVER: 0
HEADACHES: 1
CHILLS: 0
GASTROINTESTINAL NEGATIVE: 1
WEAKNESS: 0
SEIZURES: 0
PSYCHIATRIC NEGATIVE: 1
WEIGHT LOSS: 0
SPEECH CHANGE: 0
RESPIRATORY NEGATIVE: 1
EYE PAIN: 0
EYE DISCHARGE: 0
MUSCULOSKELETAL NEGATIVE: 1
TINGLING: 0
CARDIOVASCULAR NEGATIVE: 1
DIZZINESS: 0
LOSS OF CONSCIOUSNESS: 0
TREMORS: 0
DIAPHORESIS: 0

## 2018-12-20 NOTE — PROGRESS NOTES
Chief Complaint   Patient presents with   • New Patient     KIT, used to be on CPAP but it broke         HPI: This patient is a 58 y.o. male, who presents for evaluation and management of known sleep disordered breathing.     Patient's cardiologist Dr. Jurado referred him back to our office.  Cardiac history includes paroxysmal A. fib, HTN, HLD.   [facility]  PSG in 2011 indicates severe sleep apnea with an AHI of 59.6 and a minimum saturation of 64%.  He was titrated to CPAP 15 cm H2O.  His last visit was in June 2011.  He has not been on treatment for the past year because his machine broke.  He reports restless sleep, daytime fatigue, a.m. headaches and loud snoring since being off of therapy.  He did benefit greatly from CPAP when he was using it.  He is eager to restart therapy.      Past Medical History:   Diagnosis Date   • Abnormal EKG    • Atrial fibrillation (HCC)    • Chickenpox    • Dyspnea    • Citizen of the [place] measles    • Hypertension    • Influenza    • Mixed hyperlipidemia 10/1/2018   • Pain 6   • Paroxysmal atrial fibrillation (HCC)    • Snoring    • Tonsillitis        Social History   Substance Use Topics   • Smoking status: Former Smoker     Types: Cigars     Quit date: 1995   • Smokeless tobacco: Former User     Types: Chew     Quit date: 2018      Comment: 15 YEARS  QUIT IN 95   • Alcohol use 1.5 oz/week     3 Standard drinks or equivalent per week      Comment: occasionally       Family History   Problem Relation Age of Onset   • Heart Attack Father    • Sleep Apnea Father          There is no immunization history on file for this patient.    Current medications as of today   Current Outpatient Prescriptions   Medication Sig Dispense Refill   • NABUMETONE PO Take  by mouth.     • amLODIPine (NORVASC) 10 MG Tab Take 1 Tab by mouth every day. 30 Tab 11   • lisinopril-hydrochlorothiazide (PRINZIDE, ZESTORETIC) 20-12.5 MG per tablet Take 1 Tab by mouth every day.  "90 Tab 0   • tizanidine (ZANAFLEX) 4 MG Tab Take 1 Tab by mouth every 6 hours as needed. 30 Tab 3     No current facility-administered medications for this visit.        Allergies: Nkda [no known drug allergy]    Blood pressure 132/82, pulse 95, resp. rate 16, height 1.956 m (6' 5\"), weight (!) 154.2 kg (340 lb), SpO2 92 %.      Review of Systems   Constitutional: Positive for malaise/fatigue. Negative for chills, diaphoresis, fever and weight loss.   HENT: Negative.    Eyes: Negative for pain and discharge.   Respiratory: Negative.    Cardiovascular: Negative.    Gastrointestinal: Negative.    Musculoskeletal: Negative.    Skin: Negative.    Neurological: Positive for headaches. Negative for dizziness, tingling, tremors, sensory change, speech change, seizures, loss of consciousness and weakness.   Endo/Heme/Allergies: Negative for environmental allergies. Does not bruise/bleed easily.   Psychiatric/Behavioral: Negative.        Physical Exam   Constitutional: He is oriented to person, place, and time and well-developed, well-nourished, and in no distress.   HENT:   Head: Normocephalic and atraumatic.   Eyes: Pupils are equal, round, and reactive to light.   Neck: Normal range of motion. Neck supple. No tracheal deviation present.   Cardiovascular: Normal rate, regular rhythm and normal heart sounds.    Pulmonary/Chest: Effort normal and breath sounds normal. No respiratory distress. He has no wheezes. He has no rales.   Musculoskeletal: Normal range of motion.   Neurological: He is alert and oriented to person, place, and time.   Skin: Skin is warm and dry.   Psychiatric: Mood, memory, affect and judgment normal.   Vitals reviewed.      Diagnoses/Plan:    1. KIT (obstructive sleep apnea)  Order for CPAP auto 15-20 cm H2O.  Patient will restart CPAP therapy as soon as possible.  Clean mask and tubing weekly.  Follow-up in approximately 3 months for compliance download.  - DME CPAP    2. Paroxysmal atrial " "fibrillation (HCC)  Stable, patient denies palpitations, chest pain or pressure.  He follows with cardiology regularly.    3. Morbid obesity with BMI of 40.0-44.9, adult (HCC)  Patient understands sleep apnea can improve with weight loss.  He has already lost approximately 35 pounds.  He has plans to lose more.  He is working on exercise and dietary changes.  Patient's body mass index is 40.32 kg/m². Exercise and nutrition counseling were performed at this visit.      He does not have a primary care provider.  I have urged him to get one.  He declines referral to [facility].  He says he has \"leads\" on a few providers that he plans to follow-up with.      This dictation was created using voice recognition software. The accuracy of the dictation is limited to the abilities of the software. I expect there may be some errors of grammar and possibly content.     "

## 2018-12-27 DIAGNOSIS — I10 ESSENTIAL HYPERTENSION: ICD-10-CM

## 2018-12-27 DIAGNOSIS — I48.91 ATRIAL FIBRILLATION, UNSPECIFIED TYPE (HCC): ICD-10-CM

## 2018-12-27 RX ORDER — LISINOPRIL AND HYDROCHLOROTHIAZIDE 20; 12.5 MG/1; MG/1
TABLET ORAL
Qty: 90 TAB | Refills: 3 | Status: SHIPPED | OUTPATIENT
Start: 2018-12-27 | End: 2020-02-06

## 2019-02-19 ENCOUNTER — OFFICE VISIT (OUTPATIENT)
Dept: CARDIOLOGY | Facility: MEDICAL CENTER | Age: 59
End: 2019-02-19
Payer: COMMERCIAL

## 2019-02-19 VITALS
SYSTOLIC BLOOD PRESSURE: 140 MMHG | OXYGEN SATURATION: 93 % | BODY MASS INDEX: 37.19 KG/M2 | HEIGHT: 77 IN | HEART RATE: 90 BPM | DIASTOLIC BLOOD PRESSURE: 82 MMHG | WEIGHT: 315 LBS

## 2019-02-19 DIAGNOSIS — M1A.3710 CHRONIC GOUT OF RIGHT ANKLE DUE TO RENAL IMPAIRMENT WITHOUT TOPHUS: ICD-10-CM

## 2019-02-19 DIAGNOSIS — G47.33 OSA (OBSTRUCTIVE SLEEP APNEA): ICD-10-CM

## 2019-02-19 DIAGNOSIS — I48.0 PAROXYSMAL ATRIAL FIBRILLATION (HCC): ICD-10-CM

## 2019-02-19 DIAGNOSIS — I10 ESSENTIAL HYPERTENSION: ICD-10-CM

## 2019-02-19 DIAGNOSIS — R06.02 SOB (SHORTNESS OF BREATH): ICD-10-CM

## 2019-02-19 DIAGNOSIS — E78.2 MIXED HYPERLIPIDEMIA: ICD-10-CM

## 2019-02-19 PROCEDURE — 99214 OFFICE O/P EST MOD 30 MIN: CPT | Performed by: INTERNAL MEDICINE

## 2019-02-19 RX ORDER — HYDROCODONE BITARTRATE AND ACETAMINOPHEN 5; 325 MG/1; MG/1
TABLET ORAL
Refills: 0 | COMMUNITY
Start: 2019-02-05 | End: 2019-02-19

## 2019-02-19 RX ORDER — ALLOPURINOL 300 MG/1
300 TABLET ORAL DAILY
Qty: 90 TAB | Refills: 3 | Status: SHIPPED | OUTPATIENT
Start: 2019-02-19 | End: 2020-03-22

## 2019-02-19 ASSESSMENT — ENCOUNTER SYMPTOMS
PND: 0
COUGH: 0
DIZZINESS: 0
SPUTUM PRODUCTION: 0
CARDIOVASCULAR NEGATIVE: 1
FEVER: 0
EYES NEGATIVE: 1
STRIDOR: 0
CONSTITUTIONAL NEGATIVE: 1
MUSCULOSKELETAL NEGATIVE: 1
GASTROINTESTINAL NEGATIVE: 1
HEMOPTYSIS: 0
SORE THROAT: 0
BRUISES/BLEEDS EASILY: 0
PALPITATIONS: 0
WEAKNESS: 0
LOSS OF CONSCIOUSNESS: 0
NEUROLOGICAL NEGATIVE: 1
SHORTNESS OF BREATH: 0
CHILLS: 0
RESPIRATORY NEGATIVE: 1
WHEEZING: 0
CLAUDICATION: 0
ORTHOPNEA: 0

## 2019-02-19 NOTE — PROGRESS NOTES
Chief Complaint   Patient presents with   • Hyperlipidemia   • Hypertension       Subjective:   Bg Edwards is a 58 y.o. male who presents today as a follow-up for his hypertension shortness of breath atrial fibrillation COPD.  He recently completed a hip replacement surgery with no issues.  He is feeling well.  He has no shortness of breath.  Trace lower extremity and his right lower leg after surgery.  His blood pressure is doing well.  He is pending evaluation for sleep apnea and has been prescribed a sleep machine which she says is going to cost him about $1400.  Otherwise he has no chest pain or palpitations.    Past Medical History:   Diagnosis Date   • Abnormal EKG    • Atrial fibrillation (HCC)    • Chickenpox    • Dyspnea    • [place] measles    • Hypertension    • Influenza    • Mixed hyperlipidemia 10/1/2018   • Pain 6   • Paroxysmal atrial fibrillation (HCC)    • Snoring    • Tonsillitis      Past Surgical History:   Procedure Laterality Date   • FOREIGN BODY REMOVAL  6/29/2010    Performed by BRI HICKS at [facility]ERY [facility]   • LUMBAR LAMINECTOMY DISKECTOMY Bilateral    • OTHER ORTHOPEDIC SURGERY  AC RECONSTRUCTION   • OTHER ORTHOPEDIC SURGERY  LEFT THUMB   • TONSILLECTOMY     • VASECTOMY       Family History   Problem Relation Age of Onset   • Heart Attack Father    • Sleep Apnea Father      Social History     Social History   • Marital status:      Spouse name: N/A   • Number of children: N/A   • Years of education: N/A     Occupational History   • Not on file.     Social History Main Topics   • Smoking status: Former Smoker     Types: Cigars     Quit date: 1995   • Smokeless tobacco: Former User     Types: Chew     Quit date: 2018      Comment: 15 YEARS  QUIT IN 95   • Alcohol use 1.5 oz/week     3 Standard drinks or equivalent per week      Comment: occasionally   • Drug use: Yes     Frequency: 1.0 time per week     Types: Marijuana      Comment: marijuana   •  "Sexual activity: Not on file      Comment: na     Other Topics Concern   • Not on file     Social History Narrative   • No narrative on file     Allergies   Allergen Reactions   • Nkda [No Known Drug Allergy]      Outpatient Encounter Prescriptions as of 2/19/2019   Medication Sig Dispense Refill   • aspirin 81 MG tablet Take 81 mg by mouth every day.     • allopurinol (ZYLOPRIM) 300 MG Tab Take 1 Tab by mouth every day. 90 Tab 3   • lisinopril-hydrochlorothiazide (PRINZIDE, ZESTORETIC) 20-12.5 MG per tablet TAKE 1 TABLET BY MOUTH ONCE DAILY 90 Tab 3   • amLODIPine (NORVASC) 10 MG Tab Take 1 Tab by mouth every day. 30 Tab 11   • [DISCONTINUED] HYDROcodone-acetaminophen (NORCO) 5-325 MG Tab per tablet TK 1 TO 2 TS PO Q 4 H PRN P FOR 5 DAYS  0   • [DISCONTINUED] NABUMETONE PO Take  by mouth.     • [DISCONTINUED] tizanidine (ZANAFLEX) 4 MG Tab Take 1 Tab by mouth every 6 hours as needed. (Patient not taking: Reported on 2/19/2019) 30 Tab 3     No facility-administered encounter medications on file as of 2/19/2019.      Review of Systems   Constitutional: Negative.  Negative for chills, fever and malaise/fatigue.   HENT: Negative.  Negative for sore throat.    Eyes: Negative.    Respiratory: Negative.  Negative for cough, hemoptysis, sputum production, shortness of breath, wheezing and stridor.    Cardiovascular: Negative.  Negative for chest pain, palpitations, orthopnea, claudication, leg swelling and PND.   Gastrointestinal: Negative.    Genitourinary: Negative.    Musculoskeletal: Negative.    Skin: Negative.    Neurological: Negative.  Negative for dizziness, loss of consciousness and weakness.   Endo/Heme/Allergies: Negative.  Does not bruise/bleed easily.   All other systems reviewed and are negative.       Objective:   /82 (BP Location: Left arm, Patient Position: Sitting, BP Cuff Size: Adult)   Pulse 90   Ht 1.956 m (6' 5\")   Wt (!) 150.6 kg (332 lb)   SpO2 93%   BMI 39.37 kg/m²     Physical Exam "   Constitutional: He appears well-developed and well-nourished. No distress.   HENT:   Head: Normocephalic and atraumatic.   Right Ear: External ear normal.   Left Ear: External ear normal.   Nose: Nose normal.   Mouth/Throat: No oropharyngeal exudate.   Eyes: Pupils are equal, round, and reactive to light. Conjunctivae and EOM are normal. Right eye exhibits no discharge. Left eye exhibits no discharge. No scleral icterus.   Neck: Neck supple. No JVD present.   Cardiovascular: Normal rate, regular rhythm and intact distal pulses.  Exam reveals no gallop and no friction rub.    No murmur heard.  Pulmonary/Chest: Effort normal. No stridor. No respiratory distress. He has no wheezes. He has no rales. He exhibits no tenderness.   Abdominal: Soft. He exhibits no distension. There is no guarding.   Musculoskeletal: Normal range of motion. He exhibits no edema, tenderness or deformity.   Neurological: He is alert. He has normal reflexes. He displays normal reflexes. No cranial nerve deficit. He exhibits normal muscle tone. Coordination normal.   Skin: Skin is warm and dry. No rash noted. He is not diaphoretic. No erythema. No pallor.   Psychiatric: He has a normal mood and affect. His behavior is normal. Judgment and thought content normal.   Nursing note and vitals reviewed.      Assessment:     1. 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 Mixed hyperlipidemia     3. Essential hypertension     4. Paroxysmal atrial fibrillation (HCC)     5. KIT (obstructive sleep apnea)     6. SOB (shortness of breath)         Medical Decision Making:  Today's Assessment / Status / Plan:     58-year-old male with paroxysmal atrial fibrillation hypertension shortness of breath and sleep apnea.  I encouraged him to follow-up with the sleep apnea treatment.  In the meantime he has asked for refill of his gout medications which I am happy to provide to him today.  The remainder  of his risk factors remain well controlled.  We can see him back in 1 year or sooner if needed.    Thank for you allowing me to take part in your patient's care, please call should you have any questions or would like to discuss this patient.

## 2019-03-22 ENCOUNTER — APPOINTMENT (OUTPATIENT)
Dept: SLEEP MEDICINE | Facility: MEDICAL CENTER | Age: 59
End: 2019-03-22
Payer: COMMERCIAL

## 2019-05-21 ENCOUNTER — APPOINTMENT (OUTPATIENT)
Dept: SLEEP MEDICINE | Facility: MEDICAL CENTER | Age: 59
End: 2019-05-21
Payer: COMMERCIAL

## 2019-09-13 ENCOUNTER — TELEPHONE (OUTPATIENT)
Dept: CARDIOLOGY | Facility: MEDICAL CENTER | Age: 59
End: 2019-09-13

## 2019-09-13 NOTE — TELEPHONE ENCOUNTER
[facility] and hold ASA for 7 days. Form completed and faxed back to [phone] as requested. Confirmation received. Form sent for scanning.

## 2019-10-03 DIAGNOSIS — I48.0 PAROXYSMAL ATRIAL FIBRILLATION (HCC): ICD-10-CM

## 2019-10-03 DIAGNOSIS — I10 ESSENTIAL HYPERTENSION: ICD-10-CM

## 2019-10-03 DIAGNOSIS — I48.91 ATRIAL FIBRILLATION, UNSPECIFIED TYPE (HCC): ICD-10-CM

## 2019-10-03 RX ORDER — AMLODIPINE BESYLATE 10 MG/1
TABLET ORAL
Qty: 90 TAB | Refills: 3 | Status: SHIPPED | OUTPATIENT
Start: 2019-10-03 | End: 2020-09-09 | Stop reason: SDUPTHER

## 2020-02-06 DIAGNOSIS — I48.91 ATRIAL FIBRILLATION, UNSPECIFIED TYPE (HCC): ICD-10-CM

## 2020-02-06 DIAGNOSIS — I10 ESSENTIAL HYPERTENSION: ICD-10-CM

## 2020-02-07 RX ORDER — LISINOPRIL AND HYDROCHLOROTHIAZIDE 20; 12.5 MG/1; MG/1
TABLET ORAL
Qty: 90 TAB | Refills: 0 | Status: SHIPPED | OUTPATIENT
Start: 2020-02-07 | End: 2020-05-15 | Stop reason: SDUPTHER

## 2020-03-22 DIAGNOSIS — M1A.3710 CHRONIC GOUT OF RIGHT ANKLE DUE TO RENAL IMPAIRMENT WITHOUT TOPHUS: ICD-10-CM

## 2020-03-23 RX ORDER — ALLOPURINOL 300 MG/1
TABLET ORAL
Qty: 30 TAB | Refills: 0 | Status: SHIPPED | OUTPATIENT
Start: 2020-03-23 | End: 2020-04-13

## 2020-03-30 ENCOUNTER — TELEPHONE (OUTPATIENT)
Dept: HEALTH INFORMATION MANAGEMENT | Facility: OTHER | Age: 60
End: 2020-03-30

## 2020-03-30 NOTE — TELEPHONE ENCOUNTER
1. Caller Name: Bg                     Call Back Number:[phone]  [facility] PCP or Specialty Provider: No          2.  Does patient have any active symptoms of respiratory illness (fever OR cough OR shortness of breath OR sore throat)? No.    3.  Does patient have any comoribidities? DM  Possibly A1C high  Needs appointment for a CPAP sleep apnea    4.  Has the patient traveled in the last 14 days OR had any known contact with someone who is suspected or confirmed to have COVID-19?  No.    5. Disposition: Cleared by RN Triage; OK to keep/schedule appointment   appt in 15 days. Informed he will have to answer questions gain prior to appt.    Note routed to [facility] Provider: No PCP

## 2020-04-01 ENCOUNTER — OFFICE VISIT (OUTPATIENT)
Dept: CARDIOLOGY | Facility: MEDICAL CENTER | Age: 60
End: 2020-04-01
Payer: COMMERCIAL

## 2020-04-01 ENCOUNTER — TELEPHONE (OUTPATIENT)
Dept: HEALTH INFORMATION MANAGEMENT | Facility: OTHER | Age: 60
End: 2020-04-01

## 2020-04-01 VITALS
SYSTOLIC BLOOD PRESSURE: 138 MMHG | WEIGHT: 315 LBS | DIASTOLIC BLOOD PRESSURE: 82 MMHG | HEIGHT: 78 IN | BODY MASS INDEX: 36.45 KG/M2

## 2020-04-01 DIAGNOSIS — R06.02 SHORTNESS OF BREATH: ICD-10-CM

## 2020-04-01 DIAGNOSIS — I48.0 PAROXYSMAL ATRIAL FIBRILLATION (HCC): ICD-10-CM

## 2020-04-01 DIAGNOSIS — I10 ESSENTIAL HYPERTENSION: ICD-10-CM

## 2020-04-01 DIAGNOSIS — R94.31 NONSPECIFIC ABNORMAL ELECTROCARDIOGRAM (ECG) (EKG): ICD-10-CM

## 2020-04-01 DIAGNOSIS — G47.33 OSA (OBSTRUCTIVE SLEEP APNEA): ICD-10-CM

## 2020-04-01 DIAGNOSIS — Z01.810 PRE-OPERATIVE CARDIOVASCULAR EXAMINATION: ICD-10-CM

## 2020-04-01 DIAGNOSIS — E78.2 MIXED HYPERLIPIDEMIA: ICD-10-CM

## 2020-04-01 PROCEDURE — 99214 OFFICE O/P EST MOD 30 MIN: CPT | Performed by: INTERNAL MEDICINE

## 2020-04-01 RX ORDER — DICLOFENAC SODIUM 75 MG/1
75 TABLET, DELAYED RELEASE ORAL
COMMUNITY
End: 2020-07-20

## 2020-04-01 RX ORDER — TADALAFIL 5 MG/1
5 TABLET ORAL DAILY
COMMUNITY
Start: 2020-03-14

## 2020-04-01 RX ORDER — METOPROLOL SUCCINATE 25 MG/1
25 TABLET, EXTENDED RELEASE ORAL DAILY
COMMUNITY
End: 2020-04-01

## 2020-04-01 RX ORDER — ATORVASTATIN CALCIUM 10 MG/1
10 TABLET, FILM COATED ORAL DAILY
COMMUNITY

## 2020-04-01 ASSESSMENT — ENCOUNTER SYMPTOMS
CLAUDICATION: 0
PALPITATIONS: 0
FEVER: 1
DIZZINESS: 0
SORE THROAT: 0
NEUROLOGICAL NEGATIVE: 1
LOSS OF CONSCIOUSNESS: 0
RESPIRATORY NEGATIVE: 1
MUSCULOSKELETAL NEGATIVE: 1
HEMOPTYSIS: 0
WHEEZING: 0
WEAKNESS: 0
SPUTUM PRODUCTION: 0
STRIDOR: 0
CARDIOVASCULAR NEGATIVE: 1
GASTROINTESTINAL NEGATIVE: 1
PND: 0
SHORTNESS OF BREATH: 0
CHILLS: 1
ORTHOPNEA: 0
COUGH: 0
BRUISES/BLEEDS EASILY: 0
EYES NEGATIVE: 1

## 2020-04-01 NOTE — TELEPHONE ENCOUNTER
1. Caller Name: Bg                        Call Back Number: [phone]  [facility] PCP or Specialty Provider: Yes Cardiology        2.  Does patient have any active symptoms of respiratory illness (fever OR cough OR shortness of breath OR sore throat)? Yes, the patient reports the following respiratory symptoms: Chills, feels that something is coming on.     3.  Does patient have any comoribidities? DM    4.  Has the patient traveled in the last 14 days OR had any known contact with someone who is suspected or confirmed to have COVID-19?  No.    5. Disposition: Advised to perform self care, monitor for worsening symptoms and to call back in 3 days if no improvement     Not cleared for appointment with cardiology, patient will reschedule.    Note routed to [facility] Provider: AUDREY only.

## 2020-04-01 NOTE — PROGRESS NOTES
Chief Complaint   Patient presents with   • HTN (Controlled)       Subjective:   Bg Edwards is a 59 y.o. male who presents today as a follow-up for his hypertension hyperlipidemia paroxysmal atrial fibrillation and borderline diabetes.  He continues to smoke occasionally both cigars and tobacco.  He is checking his blood pressure at home which average about 130/80.  For the last couple days these have been having some fevers.  This morning he checked his temperature was 99.9.  He has no cough and no runny nose shortness of breath or chest pain.  He is wanted know if he should go to the hospital.  His last LDL was 117.    Past Medical History:   Diagnosis Date   • Abnormal EKG    • Atrial fibrillation (HCC)    • Chickenpox    • Dyspnea    • [place] measles    • Hypertension    • Influenza    • Mixed hyperlipidemia 10/1/2018   • Pain 6   • Paroxysmal atrial fibrillation (HCC)    • Snoring    • Tonsillitis      Past Surgical History:   Procedure Laterality Date   • FOREIGN BODY REMOVAL  2010    Performed by BRI HIKCS at [facility] DAY [facility]   • LUMBAR LAMINECTOMY DISKECTOMY Bilateral    • OTHER ORTHOPEDIC SURGERY  AC RECONSTRUCTION   • OTHER ORTHOPEDIC SURGERY  LEFT THUMB   • TONSILLECTOMY     • VASECTOMY       Family History   Problem Relation Age of Onset   • Heart Attack Father    • Sleep Apnea Father      Social History     Socioeconomic History   • Marital status:      Spouse name: Not on file   • Number of children: Not on file   • Years of education: Not on file   • Highest education level: Not on file   Occupational History   • Not on file   Social Needs   • Financial resource strain: Not on file   • Food insecurity     Worry: Not on file     Inability: Not on file   • Transportation needs     Medical: Not on file     Non-medical: Not on file   Tobacco Use   • Smoking status: Former Smoker     Types: Cigars     Last attempt to quit:      Years since quittin.2    • Smokeless tobacco: Former User     Types: Chew     Quit date: 2018   • Tobacco comment: 15 YEARS  QUIT IN 95   Substance and Sexual Activity   • Alcohol use: Yes     Alcohol/week: 1.5 oz     Types: 3 Standard drinks or equivalent per week     Comment: occasionally   • Drug use: Yes     Frequency: 1.0 times per week     Types: Marijuana     Comment: marijuana   • Sexual activity: Not on file     Comment: na   Lifestyle   • Physical activity     Days per week: Not on file     Minutes per session: Not on file   • Stress: Not on file   Relationships   • Social connections     Talks on phone: Not on file     Gets together: Not on file     Attends Muslim service: Not on file     Active member of club or organization: Not on file     Attends meetings of clubs or organizations: Not on file     Relationship status: Not on file   • Intimate partner violence     Fear of current or ex partner: Not on file     Emotionally abused: Not on file     Physically abused: Not on file     Forced sexual activity: Not on file   Other Topics Concern   • Not on file   Social History Narrative   • Not on file     Allergies   Allergen Reactions   • Nkda [No Known Drug Allergy]      Outpatient Encounter Medications as of 4/1/2020   Medication Sig Dispense Refill   • metFORMIN (GLUCOPHAGE) 500 MG Tab Take 500 mg by mouth.     • atorvastatin (LIPITOR) 10 MG Tab Take 10 mg by mouth every evening.     • tadalafil (CIALIS) 5 MG tablet Take 5 mg by mouth.     • metoprolol SR (TOPROL XL) 25 MG TABLET SR 24 HR Take 25 mg by mouth every day.     • diclofenac EC (VOLTAREN) 75 MG Tablet Delayed Response diclofenac sodium 75 mg tablet,delayed release     • allopurinol (ZYLOPRIM) 300 MG Tab Take 1 tablet by mouth once daily 30 Tab 0   • lisinopril-hydrochlorothiazide (PRINZIDE) 20-12.5 MG per tablet TAKE 1 TABLET BY MOUTH ONCE DAILY 90 Tab 0   • amLODIPine (NORVASC) 10 MG Tab TAKE 1 TABLET BY MOUTH ONCE DAILY 90 Tab 3   • aspirin 81 MG tablet Take 81  "mg by mouth every day.       No facility-administered encounter medications on file as of 4/1/2020.      Review of Systems   Constitutional: Positive for chills and fever. Negative for malaise/fatigue.   HENT: Negative.  Negative for sore throat.    Eyes: Negative.    Respiratory: Negative.  Negative for cough, hemoptysis, sputum production, shortness of breath, wheezing and stridor.    Cardiovascular: Negative.  Negative for chest pain, palpitations, orthopnea, claudication, leg swelling and PND.   Gastrointestinal: Negative.    Genitourinary: Negative.    Musculoskeletal: Negative.    Skin: Negative.    Neurological: Negative.  Negative for dizziness, loss of consciousness and weakness.   Endo/Heme/Allergies: Negative.  Does not bruise/bleed easily.   All other systems reviewed and are negative.       Objective:   /82 (BP Location: Left arm, Patient Position: Sitting)   Ht 1.981 m (6' 6\")   Wt (!) 148.8 kg (328 lb)   BMI 37.90 kg/m²     Physical Exam   Constitutional: He appears well-developed and well-nourished. No distress.   HENT:   Head: Normocephalic and atraumatic.   Right Ear: External ear normal.   Left Ear: External ear normal.   Nose: Nose normal.   Mouth/Throat: No oropharyngeal exudate.   Eyes: Pupils are equal, round, and reactive to light. Conjunctivae and EOM are normal. Right eye exhibits no discharge. Left eye exhibits no discharge. No scleral icterus.   Neck: Neck supple. No JVD present.   Cardiovascular: Normal rate, regular rhythm and intact distal pulses. Exam reveals no gallop and no friction rub.   No murmur heard.  Pulmonary/Chest: Effort normal. No stridor. No respiratory distress. He has no wheezes. He has no rales. He exhibits no tenderness.   Abdominal: Soft. He exhibits no distension. There is no guarding.   Musculoskeletal: Normal range of motion.         General: No tenderness, deformity or edema.   Neurological: He is alert. He has normal reflexes. He displays normal " reflexes. No cranial nerve deficit. He exhibits normal muscle tone. Coordination normal.   Skin: Skin is warm and dry. No rash noted. He is not diaphoretic. No erythema. No pallor.   Psychiatric: He has a normal mood and affect. His behavior is normal. Judgment and thought content normal.   Nursing note and vitals reviewed.      Assessment:     1. SOB (shortness of breath)     2. Pre-operative cardiovascular examination     3. KIT (obstructive sleep apnea)     4. Nonspecific abnormal electrocardiogram (ECG) (EKG)     5. Mixed hyperlipidemia     6. Essential hypertension     7. Paroxysmal atrial fibrillation (HCC)         Medical Decision Making:  Today's Assessment / Status / Plan:     59-year-old male with paroxysmal atrial fibrillation shortness of breath sleep apnea.  His risk factors are well managed.  I did give hime anticipatory guidance for COVID.  He understands and will continue to follow his symptoms.  He will call if he has any decompensation.

## 2020-04-13 DIAGNOSIS — M1A.3710 CHRONIC GOUT OF RIGHT ANKLE DUE TO RENAL IMPAIRMENT WITHOUT TOPHUS: ICD-10-CM

## 2020-04-13 RX ORDER — ALLOPURINOL 300 MG/1
TABLET ORAL
Qty: 30 TAB | Refills: 0 | Status: SHIPPED | OUTPATIENT
Start: 2020-04-13 | End: 2020-05-26

## 2020-04-15 ENCOUNTER — APPOINTMENT (OUTPATIENT)
Dept: SLEEP MEDICINE | Facility: MEDICAL CENTER | Age: 60
End: 2020-04-15
Payer: COMMERCIAL

## 2020-05-15 DIAGNOSIS — I10 ESSENTIAL HYPERTENSION: ICD-10-CM

## 2020-05-15 DIAGNOSIS — I48.91 ATRIAL FIBRILLATION, UNSPECIFIED TYPE (HCC): ICD-10-CM

## 2020-05-15 RX ORDER — LISINOPRIL AND HYDROCHLOROTHIAZIDE 20; 12.5 MG/1; MG/1
1 TABLET ORAL DAILY
Qty: 90 TAB | Refills: 1 | Status: SHIPPED | OUTPATIENT
Start: 2020-05-15 | End: 2020-10-27

## 2020-05-23 DIAGNOSIS — M1A.3710 CHRONIC GOUT OF RIGHT ANKLE DUE TO RENAL IMPAIRMENT WITHOUT TOPHUS: ICD-10-CM

## 2020-05-26 RX ORDER — ALLOPURINOL 300 MG/1
TABLET ORAL
Qty: 30 TAB | Refills: 0 | Status: SHIPPED | OUTPATIENT
Start: 2020-05-26 | End: 2020-07-02

## 2020-07-01 DIAGNOSIS — M1A.3710 CHRONIC GOUT OF RIGHT ANKLE DUE TO RENAL IMPAIRMENT WITHOUT TOPHUS: ICD-10-CM

## 2020-07-02 RX ORDER — ALLOPURINOL 300 MG/1
TABLET ORAL
Qty: 30 TAB | Refills: 0 | Status: SHIPPED | OUTPATIENT
Start: 2020-07-02 | End: 2020-08-06

## 2020-07-20 ENCOUNTER — APPOINTMENT (OUTPATIENT)
Dept: RADIOLOGY | Facility: MEDICAL CENTER | Age: 60
End: 2020-07-20
Attending: EMERGENCY MEDICINE
Payer: COMMERCIAL

## 2020-07-20 ENCOUNTER — HOSPITAL ENCOUNTER (EMERGENCY)
Facility: MEDICAL CENTER | Age: 60
End: 2020-07-20
Attending: EMERGENCY MEDICINE
Payer: COMMERCIAL

## 2020-07-20 VITALS
SYSTOLIC BLOOD PRESSURE: 123 MMHG | HEIGHT: 78 IN | TEMPERATURE: 97.5 F | DIASTOLIC BLOOD PRESSURE: 68 MMHG | WEIGHT: 315 LBS | OXYGEN SATURATION: 93 % | HEART RATE: 70 BPM | RESPIRATION RATE: 16 BRPM | BODY MASS INDEX: 36.45 KG/M2

## 2020-07-20 DIAGNOSIS — I48.91 ATRIAL FIBRILLATION WITH RAPID VENTRICULAR RESPONSE (HCC): ICD-10-CM

## 2020-07-20 LAB
ALBUMIN SERPL BCP-MCNC: 4.4 G/DL (ref 3.2–4.9)
ALBUMIN/GLOB SERPL: 1.6 G/DL
ALP SERPL-CCNC: 87 U/L (ref 30–99)
ALT SERPL-CCNC: 25 U/L (ref 2–50)
ANION GAP SERPL CALC-SCNC: 11 MMOL/L (ref 7–16)
AST SERPL-CCNC: 24 U/L (ref 12–45)
BASOPHILS # BLD AUTO: 0.4 % (ref 0–1.8)
BASOPHILS # BLD: 0.03 K/UL (ref 0–0.12)
BILIRUB SERPL-MCNC: 0.5 MG/DL (ref 0.1–1.5)
BUN SERPL-MCNC: 19 MG/DL (ref 8–22)
CALCIUM SERPL-MCNC: 9.7 MG/DL (ref 8.4–10.2)
CHLORIDE SERPL-SCNC: 102 MMOL/L (ref 96–112)
CO2 SERPL-SCNC: 27 MMOL/L (ref 20–33)
CREAT SERPL-MCNC: 1.02 MG/DL (ref 0.5–1.4)
EKG IMPRESSION: NORMAL
EOSINOPHIL # BLD AUTO: 0.15 K/UL (ref 0–0.51)
EOSINOPHIL NFR BLD: 1.9 % (ref 0–6.9)
ERYTHROCYTE [DISTWIDTH] IN BLOOD BY AUTOMATED COUNT: 43.7 FL (ref 35.9–50)
GLOBULIN SER CALC-MCNC: 2.7 G/DL (ref 1.9–3.5)
GLUCOSE SERPL-MCNC: 138 MG/DL (ref 65–99)
HCT VFR BLD AUTO: 47.4 % (ref 42–52)
HGB BLD-MCNC: 15.9 G/DL (ref 14–18)
IMM GRANULOCYTES # BLD AUTO: 0.03 K/UL (ref 0–0.11)
IMM GRANULOCYTES NFR BLD AUTO: 0.4 % (ref 0–0.9)
LYMPHOCYTES # BLD AUTO: 1.46 K/UL (ref 1–4.8)
LYMPHOCYTES NFR BLD: 18.6 % (ref 22–41)
MAGNESIUM SERPL-MCNC: 2.2 MG/DL (ref 1.5–2.5)
MCH RBC QN AUTO: 29.4 PG (ref 27–33)
MCHC RBC AUTO-ENTMCNC: 33.5 G/DL (ref 33.7–35.3)
MCV RBC AUTO: 87.8 FL (ref 81.4–97.8)
MONOCYTES # BLD AUTO: 0.72 K/UL (ref 0–0.85)
MONOCYTES NFR BLD AUTO: 9.2 % (ref 0–13.4)
NEUTROPHILS # BLD AUTO: 5.44 K/UL (ref 1.82–7.42)
NEUTROPHILS NFR BLD: 69.5 % (ref 44–72)
NRBC # BLD AUTO: 0 K/UL
NRBC BLD-RTO: 0 /100 WBC
PLATELET # BLD AUTO: 221 K/UL (ref 164–446)
PMV BLD AUTO: 9.8 FL (ref 9–12.9)
POTASSIUM SERPL-SCNC: 4 MMOL/L (ref 3.6–5.5)
PROT SERPL-MCNC: 7.1 G/DL (ref 6–8.2)
RBC # BLD AUTO: 5.4 M/UL (ref 4.7–6.1)
SODIUM SERPL-SCNC: 140 MMOL/L (ref 135–145)
TROPONIN T SERPL-MCNC: 16 NG/L (ref 6–19)
TSH SERPL DL<=0.005 MIU/L-ACNC: 1.25 UIU/ML (ref 0.38–5.33)
WBC # BLD AUTO: 7.8 K/UL (ref 4.8–10.8)

## 2020-07-20 PROCEDURE — 94770 HCHG CO2 EXPIRED GAS DETERMINATION: CPT

## 2020-07-20 PROCEDURE — 700105 HCHG RX REV CODE 258: Performed by: EMERGENCY MEDICINE

## 2020-07-20 PROCEDURE — 700111 HCHG RX REV CODE 636 W/ 250 OVERRIDE (IP): Performed by: EMERGENCY MEDICINE

## 2020-07-20 PROCEDURE — 84484 ASSAY OF TROPONIN QUANT: CPT

## 2020-07-20 PROCEDURE — 93005 ELECTROCARDIOGRAM TRACING: CPT | Performed by: EMERGENCY MEDICINE

## 2020-07-20 PROCEDURE — 96374 THER/PROPH/DIAG INJ IV PUSH: CPT

## 2020-07-20 PROCEDURE — 92960 CARDIOVERSION ELECTRIC EXT: CPT

## 2020-07-20 PROCEDURE — 84443 ASSAY THYROID STIM HORMONE: CPT

## 2020-07-20 PROCEDURE — 99285 EMERGENCY DEPT VISIT HI MDM: CPT

## 2020-07-20 PROCEDURE — 96375 TX/PRO/DX INJ NEW DRUG ADDON: CPT

## 2020-07-20 PROCEDURE — 71045 X-RAY EXAM CHEST 1 VIEW: CPT

## 2020-07-20 PROCEDURE — 93005 ELECTROCARDIOGRAM TRACING: CPT

## 2020-07-20 PROCEDURE — 36415 COLL VENOUS BLD VENIPUNCTURE: CPT

## 2020-07-20 PROCEDURE — 85025 COMPLETE CBC W/AUTO DIFF WBC: CPT

## 2020-07-20 PROCEDURE — 80053 COMPREHEN METABOLIC PANEL: CPT

## 2020-07-20 PROCEDURE — 83735 ASSAY OF MAGNESIUM: CPT

## 2020-07-20 RX ORDER — DILTIAZEM HYDROCHLORIDE 5 MG/ML
20 INJECTION INTRAVENOUS ONCE
Status: COMPLETED | OUTPATIENT
Start: 2020-07-20 | End: 2020-07-20

## 2020-07-20 RX ORDER — OMEPRAZOLE 20 MG/1
20 CAPSULE, DELAYED RELEASE ORAL
COMMUNITY

## 2020-07-20 RX ORDER — SODIUM CHLORIDE 9 MG/ML
1000 INJECTION, SOLUTION INTRAVENOUS ONCE
Status: COMPLETED | OUTPATIENT
Start: 2020-07-20 | End: 2020-07-20

## 2020-07-20 RX ADMIN — SODIUM CHLORIDE 1000 ML: 9 INJECTION, SOLUTION INTRAVENOUS at 14:05

## 2020-07-20 RX ADMIN — SODIUM CHLORIDE 1000 ML: 9 INJECTION, SOLUTION INTRAVENOUS at 12:03

## 2020-07-20 RX ADMIN — AMIODARONE HYDROCHLORIDE 150 MG: 1.5 INJECTION, SOLUTION INTRAVENOUS at 13:00

## 2020-07-20 RX ADMIN — PROPOFOL 50 MG: 10 INJECTION, EMULSION INTRAVENOUS at 14:15

## 2020-07-20 RX ADMIN — DILTIAZEM HYDROCHLORIDE 20 MG: 5 INJECTION INTRAVENOUS at 10:43

## 2020-07-20 NOTE — ED TRIAGE NOTES
"Pt straight back for EKG,  Chief Complaint   Patient presents with   • Irregular Heart Beat     started this am when he awoke, hx of afib reports it feels the same, states he drank \"alot of tazo black tea\" yesterday    Pt A & 0 x 4, speech clear, ambulates well  COVID-19 screening criteria completed, pt denies high risk travel and denies contact with COVID-19 positive pt    Pt updated on triage process and asked to inform RN of any changes while waiting in lobby.     "

## 2020-07-20 NOTE — ED PROVIDER NOTES
ED Provider Note    CHIEF COMPLAINT  Chief Complaint   Patient presents with   • Irregular Heart Beat       HPI  Bg Edwards is a 60 y.o. male who presents for evaluation of palpitations.  The patient has a known history of paroxysmal atrial fibrillation.  His last episode was apparently 3 to 4 years ago.  He is on amlodipine.  He was determined that this was intermittent and did not require long-term anticoagulation.  He reports drinking some extra caffeinated and energy drinks yesterday and started feeling palpitations this morning.  He denies strokelike symptom such as numbness weakness tingling to the arms legs or face.  He denies chest pain shortness of breath or leg swelling.    REVIEW OF SYSTEMS  See HPI for further details.  No night sweats weight loss numbness tingling weakness rash all other systems are negative.     PAST MEDICAL HISTORY  Past Medical History:   Diagnosis Date   • Mixed hyperlipidemia 10/1/2018   • Abnormal EKG    • Atrial fibrillation (HCC)    • Chickenpox    • Diabetes (HCC)    • Dyspnea    • [place] measles    • Hypertension    • Influenza    • Pain 6   • Paroxysmal atrial fibrillation (HCC)    • Snoring    • Tonsillitis        FAMILY HISTORY  Noncontributory    SOCIAL HISTORY  Social History     Socioeconomic History   • Marital status:      Spouse name: Not on file   • Number of children: Not on file   • Years of education: Not on file   • Highest education level: Not on file   Occupational History   • Not on file   Social Needs   • Financial resource strain: Not on file   • Food insecurity     Worry: Not on file     Inability: Not on file   • Transportation needs     Medical: Not on file     Non-medical: Not on file   Tobacco Use   • Smoking status: Former Smoker     Types: Cigars     Last attempt to quit:      Years since quittin.5   • Smokeless tobacco: Former User     Types: Chew     Quit date:    • Tobacco comment: 15 YEARS  QUIT IN 95   Substance  and Sexual Activity   • Alcohol use: Yes     Alcohol/week: 1.5 oz     Types: 3 Standard drinks or equivalent per week     Comment: occasionally   • Drug use: Yes     Frequency: 1.0 times per week     Types: Marijuana     Comment: marijuana   • Sexual activity: Not on file     Comment: na   Lifestyle   • Physical activity     Days per week: Not on file     Minutes per session: Not on file   • Stress: Not on file   Relationships   • Social connections     Talks on phone: Not on file     Gets together: Not on file     Attends Nondenominational service: Not on file     Active member of club or organization: Not on file     Attends meetings of clubs or organizations: Not on file     Relationship status: Not on file   • Intimate partner violence     Fear of current or ex partner: Not on file     Emotionally abused: Not on file     Physically abused: Not on file     Forced sexual activity: Not on file   Other Topics Concern   • Not on file   Social History Narrative   • Not on file     Denies IV drugs  SURGICAL HISTORY  Past Surgical History:   Procedure Laterality Date   • FOREIGN BODY REMOVAL  6/29/2010    Performed by BRI HICKS at SURGERY SAME DAY [facility] ORS   • LUMBAR LAMINECTOMY DISKECTOMY Bilateral    • OTHER ORTHOPEDIC SURGERY  AC RECONSTRUCTION   • OTHER ORTHOPEDIC SURGERY  LEFT THUMB   • TONSILLECTOMY     • VASECTOMY         CURRENT MEDICATIONS  Home Medications     Reviewed by Brandon Avendano (Pharmacy Tech) on 07/20/20 at 1040  Med List Status: Complete   Medication Last Dose Status   allopurinol (ZYLOPRIM) 300 MG Tab 7/20/2020 Active   amLODIPine (NORVASC) 10 MG Tab 7/20/2020 Active   atorvastatin (LIPITOR) 10 MG Tab 7/19/2020 Active   lisinopril-hydrochlorothiazide (PRINZIDE) 20-12.5 MG per tablet 7/20/2020 Active   metFORMIN (GLUCOPHAGE) 500 MG Tab 7/20/2020 Active   omeprazole (PRILOSEC) 20 MG delayed-release capsule 7/20/2020 Active   tadalafil (CIALIS) 5 MG tablet 7/20/2020 Active           "      ALLERGIES  Allergies   Allergen Reactions   • Nkda [No Known Drug Allergy]        PHYSICAL EXAM  VITAL SIGNS: /89   Pulse (!) 104   Temp 36.7 °C (98.1 °F)   Resp 16   Ht 1.981 m (6' 6\")   Wt (!) 153 kg (337 lb 4.9 oz)   SpO2 98%   BMI 38.98 kg/m²       Constitutional: Well developed, Well nourished, No acute distress, Non-toxic appearance.   HENT: Normocephalic, Atraumatic, Bilateral external ears normal, Oropharynx moist, No oral exudates, Nose normal.   Eyes: PERRLA, EOMI, Conjunctiva normal, No discharge.   Neck: Normal range of motion, No tenderness, Supple, No stridor.   Cardiovascular: Tachycardic irregularly irregular, No murmurs, No rubs, No gallops.   Thorax & Lungs: Normal breath sounds, No respiratory distress, No wheezing, No chest tenderness.   Abdomen: Bowel sounds normal, Soft, No tenderness, No masses, No pulsatile masses.   Skin: Warm, Dry, No erythema, No rash.   Back: No tenderness, No CVA tenderness.   Extremities: Intact distal pulses, No edema, No tenderness, No cyanosis, No clubbing.   Musculoskeletal: Good range of motion in all major joints. No tenderness to palpation or major deformities noted.   Neurologic: Alert & oriented x 3, Normal motor function, Normal sensory function, No focal deficits noted.   Psychiatric: Affect normal, Judgment normal, Mood normal.     Results for orders placed or performed during the hospital encounter of 07/20/20   TSH   Result Value Ref Range    TSH 1.250 0.380 - 5.330 uIU/mL   CBC WITH DIFFERENTIAL   Result Value Ref Range    WBC 7.8 4.8 - 10.8 K/uL    RBC 5.40 4.70 - 6.10 M/uL    Hemoglobin 15.9 14.0 - 18.0 g/dL    Hematocrit 47.4 42.0 - 52.0 %    MCV 87.8 81.4 - 97.8 fL    MCH 29.4 27.0 - 33.0 pg    MCHC 33.5 (L) 33.7 - 35.3 g/dL    RDW 43.7 35.9 - 50.0 fL    Platelet Count 221 164 - 446 K/uL    MPV 9.8 9.0 - 12.9 fL    Neutrophils-Polys 69.50 44.00 - 72.00 %    Lymphocytes 18.60 (L) 22.00 - 41.00 %    Monocytes 9.20 0.00 - 13.40 %    " Eosinophils 1.90 0.00 - 6.90 %    Basophils 0.40 0.00 - 1.80 %    Immature Granulocytes 0.40 0.00 - 0.90 %    Nucleated RBC 0.00 /100 WBC    Neutrophils (Absolute) 5.44 1.82 - 7.42 K/uL    Lymphs (Absolute) 1.46 1.00 - 4.80 K/uL    Monos (Absolute) 0.72 0.00 - 0.85 K/uL    Eos (Absolute) 0.15 0.00 - 0.51 K/uL    Baso (Absolute) 0.03 0.00 - 0.12 K/uL    Immature Granulocytes (abs) 0.03 0.00 - 0.11 K/uL    NRBC (Absolute) 0.00 K/uL   Comp Metabolic Panel   Result Value Ref Range    Sodium 140 135 - 145 mmol/L    Potassium 4.0 3.6 - 5.5 mmol/L    Chloride 102 96 - 112 mmol/L    Co2 27 20 - 33 mmol/L    Anion Gap 11.0 7.0 - 16.0    Glucose 138 (H) 65 - 99 mg/dL    Bun 19 8 - 22 mg/dL    Creatinine 1.02 0.50 - 1.40 mg/dL    Calcium 9.7 8.4 - 10.2 mg/dL    AST(SGOT) 24 12 - 45 U/L    ALT(SGPT) 25 2 - 50 U/L    Alkaline Phosphatase 87 30 - 99 U/L    Total Bilirubin 0.5 0.1 - 1.5 mg/dL    Albumin 4.4 3.2 - 4.9 g/dL    Total Protein 7.1 6.0 - 8.2 g/dL    Globulin 2.7 1.9 - 3.5 g/dL    A-G Ratio 1.6 g/dL   TROPONIN   Result Value Ref Range    Troponin T 16 6 - 19 ng/L   MAGNESIUM   Result Value Ref Range    Magnesium 2.2 1.5 - 2.5 mg/dL   ESTIMATED GFR   Result Value Ref Range    GFR If African American >60 >60 mL/min/1.73 m 2    GFR If Non African American >60 >60 mL/min/1.73 m 2   EKG   Result Value Ref Range    Report       [facility] Emergency Dept.    Test Date:  2020  Pt Name:    ANANTH CASTROPATRICIA            Department: Health system  MRN:        [id number]                      Room:  Gender:     Male                         Technician: [id number]  :        1960                   Requested By:ER TRIAGE PROTOCOL  Order #:    [id number]                    Reading MD:    Measurements  Intervals                                Axis  Rate:       130                          P:  DE:                                      QRS:        -57  QRSD:       110                          T:          29  QT:          331  QTc:        487    Interpretive Statements  Atrial fibrillation  Abnormal R-wave progression, late transition  Left ventricular hypertrophy  Inferior infarct, old  ST elevation, consider anterior injury  Compared to ECG 10/01/2018 14:07:43  Myocardial infarct finding now present  ST (T wave) deviation now present  Sinus rhythm no longer present          DX-CHEST-PORTABLE (1 VIEW)   Final Result      No acute cardiopulmonary findings.          EKG interpretation by me rate 130 rhythm atrial fibrillation.  Poor R wave progression noted.  Possible ST slurring in the precordial and anterior leads but no clear STEMI criteria    EKG #2 rate approximately 70 sinus rhythm no acute ST segment elevation or depression no pathological T wave inversions no ectopy intervals and axis are otherwise normal.  Interpretation restoration of sinus rhythm    COURSE & MEDICAL DECISION MAKING  Pertinent Labs & Imaging studies reviewed. (See chart for details)  An IV was established.  Patient has a history of paroxysmal atrial fibrillation.  He distinctly reports symptoms began this morning after he woke up.  He has no strokelike symptoms.  His laboratory studies are reassuring.  We administered a dose of diltiazem as well as amiodarone and observed him for around 3 hours.  He is hoping to chemically cardiovert which apparently has worked in the past.  The patient ultimately would prefer for urgent electrical cardioversion.  Patient has no underlying valvular disease and strokelike symptoms and is clearly understands the risks including stroke. patient underwent this procedure by myself with success.  Postprocedural electrocardiogram demonstrates restoration of sinus rhythm    Patient procedure: Conscious sedation and electrical cardioversion.  Verbal and written consent was obtained.  The patient was determined to be ASA classification 1.  He has been nothing by mouth for 8 hours.  He was placed on continuous pulse oximetry, cardiac  monitoring, end-tidal CO2 monitoring as well as blood pressure monitoring.  His preprocedural blood pressure was 135/79.  He was given a total of 50 mg of IV propofol.  Using synchronized cardioversion 120 J were administered to the patient.  We successfully electrically cardioverted the patient on the first attempt no complications.  The patient tolerated the procedure well and was observed for 45 minutes and was ambulatory.  He will be discharged home with his wife    Patient tolerated the procedure well.  I recommended he start taking a full-strength aspirin and he will be referred back to cardiology for ongoing management    FINAL IMPRESSION  1.  Paroxysmal atrial fibrillation status post ERP conscious sedation and electrical cardioversion         Electronically signed by: Nathaniel Nicole M.D., 7/20/2020 10:54 AM

## 2020-07-20 NOTE — ED NOTES
1410 - Consents signed and on chart.  O2 2L via NC for procedure.  RT at BS.  1413 - Time out called.  1415 - Propofol administered.  1416 - Synchronized cardioversion w/ 120J; successful.   1418 - Pt awake, conversing.  EKG in progress.

## 2020-07-20 NOTE — FLOWSHEET NOTE
Standby for cardioversion, following vitals right after procedure.  Tolerated procedure well, continuously monitoring SPO2 and ETCO2.  Suction and ambu bag available.     07/20/20 1420   Vital Signs   Pulse 70   Respiration 18   Pulse Oximetry 96 %   CO2 Monitoring   ETCO2 (mmHg) 28   $ ETCO2 Monitoring Yes   Oxygen   O2 (LPM) 4  (decreased back to 2 post procedure)   O2 Delivery Device Silicone Nasal Cannula   Resuscitation Device at Bedside Self Inflating Bag

## 2020-07-20 NOTE — ED NOTES
Assuming care of patient.  TSH pending.  Resting w/ call light in reach; aware to call for any needs/changes.

## 2020-08-04 DIAGNOSIS — M1A.3710 CHRONIC GOUT OF RIGHT ANKLE DUE TO RENAL IMPAIRMENT WITHOUT TOPHUS: ICD-10-CM

## 2020-08-06 RX ORDER — ALLOPURINOL 300 MG/1
TABLET ORAL
Qty: 30 TAB | Refills: 0 | Status: SHIPPED | OUTPATIENT
Start: 2020-08-06 | End: 2020-09-15

## 2020-09-09 DIAGNOSIS — I10 ESSENTIAL HYPERTENSION: ICD-10-CM

## 2020-09-09 DIAGNOSIS — I48.91 ATRIAL FIBRILLATION, UNSPECIFIED TYPE (HCC): ICD-10-CM

## 2020-09-09 DIAGNOSIS — I48.0 PAROXYSMAL ATRIAL FIBRILLATION (HCC): ICD-10-CM

## 2020-09-09 RX ORDER — AMLODIPINE BESYLATE 10 MG/1
10 TABLET ORAL DAILY
Qty: 90 TAB | Refills: 1 | Status: SHIPPED | OUTPATIENT
Start: 2020-09-09

## 2020-09-12 DIAGNOSIS — M1A.3710 CHRONIC GOUT OF RIGHT ANKLE DUE TO RENAL IMPAIRMENT WITHOUT TOPHUS: ICD-10-CM

## 2020-09-17 RX ORDER — ALLOPURINOL 300 MG/1
TABLET ORAL
Qty: 30 TAB | Refills: 2 | Status: SHIPPED | OUTPATIENT
Start: 2020-09-17

## 2020-10-07 ENCOUNTER — OFFICE VISIT (OUTPATIENT)
Dept: SLEEP MEDICINE | Facility: MEDICAL CENTER | Age: 60
End: 2020-10-07
Payer: COMMERCIAL

## 2020-10-07 VITALS
BODY MASS INDEX: 37.19 KG/M2 | SYSTOLIC BLOOD PRESSURE: 134 MMHG | HEIGHT: 77 IN | OXYGEN SATURATION: 97 % | HEART RATE: 85 BPM | WEIGHT: 315 LBS | DIASTOLIC BLOOD PRESSURE: 78 MMHG

## 2020-10-07 DIAGNOSIS — I48.91 ATRIAL FIBRILLATION, UNSPECIFIED TYPE (HCC): ICD-10-CM

## 2020-10-07 DIAGNOSIS — I10 HYPERTENSION, UNSPECIFIED TYPE: ICD-10-CM

## 2020-10-07 DIAGNOSIS — G47.33 OSA (OBSTRUCTIVE SLEEP APNEA): ICD-10-CM

## 2020-10-07 PROCEDURE — 99213 OFFICE O/P EST LOW 20 MIN: CPT | Performed by: NURSE PRACTITIONER

## 2020-10-07 ASSESSMENT — FIBROSIS 4 INDEX: FIB4 SCORE: 1.32

## 2020-10-07 NOTE — PROGRESS NOTES
Chief Complaint   Patient presents with   • Follow-Up     KIT-Last seen 12/20/2018       HPI:      Mr. Edwards is a 61 y/o male patient who is in today for annual KIT f/u, overdue.  Former [facility] patient.  PMH includes hypertension, shortness of breath, atrial fibrillation, gout, morbid obesity, mixed hyperlipidemia.    PSG split night study from 3/15/11 indicated severe sleep apnea with an AHI of 59.6 and a minimum saturation of 64%.  CPAP titration was started at a pressure of 5 and titrated as high as 15.  It seems like the CPAP pressure of 12, which was only done at 29 minutes, however resultant AHI on that pressure was 0 with lowest O2 saturation being 90, and average being 90.9.     Patient was last seen on 12/20/2018.  During that visit he stated that his CPAP broke and he was not on therapy for about a year.  Compliance report is not available for review today as the patient states that he never received a new CPAP in 2018 and he now has not been off of therapy for the last 2 years.  He was going through [facility] who after multiple times of him calling them never responded back to him.  He is very frustrated with her customer service and would prefer to deal with someone else.  He was tolerating the pressure and mask well.  He was using a full facemask and the pressure of the CPAP was set at 15, and the machine was a Respironics CPAP which she would like to continue using.  He goes to bed at 8 pm and wakes up at 4 am. He reports restless sleep, increased daytime fatigue and he does to fall asleep during the day, denies morning headaches but does report loud snoring since being off of therapy.  He also wakes up every hour due to nocturia which has also been very disruptive.  He is very eager to start therapy because he finds the CPAP very beneficial to his overall well being.  He takes his blood pressure medication as directed and his blood pressure is managed by his PCP.  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  He does not have a set exercise regimen that he follows.  He denies any new health problems or medications.      ROS:    Constitutional: Denies fevers, Denies weight changes  Eyes: Denies changes in vision, no eye pain  Ears/Nose/Throat/Mouth: Denies nasal congestion or sore throat   Cardiovascular: Denies chest pain or palpitations   Respiratory: Denies shortness of breath , Denies cough  Gastrointestinal/Hepatic: Denies abdominal pain, nausea, vomiting,   Skin/Breast: Denies rash,   Neurological: Denies headache, confusion,   Psychiatric: denies mood disorder   Sleep: denies snoring       Past Medical History:   Diagnosis Date   • Abnormal EKG    • Atrial fibrillation (HCC)    • Chickenpox    • Diabetes (HCC)    • Dyspnea    • [place] measles    • Hypertension    • Influenza    • Mixed hyperlipidemia 10/1/2018   • Pain 6   • Paroxysmal atrial fibrillation (HCC)    • Snoring    • Tonsillitis        Past Surgical History:   Procedure Laterality Date   • FOREIGN BODY REMOVAL  6/29/2010    Performed by BRI HICKS at SURGERY SAME DAY [facility] ORS   • LUMBAR LAMINECTOMY DISKECTOMY Bilateral    • OTHER ORTHOPEDIC SURGERY  AC RECONSTRUCTION   • OTHER ORTHOPEDIC SURGERY  LEFT THUMB   • TONSILLECTOMY     • VASECTOMY         Family History   Problem Relation Age of Onset   • Heart Attack Father    • Sleep Apnea Father        Social History     Socioeconomic History   • Marital status:      Spouse name: Not on file   • Number of children: Not on file   • Years of education: Not on file   • Highest education level: Not on file   Occupational History   • Not on file   Social Needs   • Financial resource strain: Not on file   • Food insecurity     Worry: Not on file     Inability: Not on file   • Transportation needs     Medical: Not on file     Non-medical: Not on file   Tobacco Use   • Smoking status: Former Smoker     Types:  Cigars     Quit date:      Years since quittin.7   • Smokeless tobacco: Former User     Types: Chew     Quit date: 2018   • Tobacco comment: 15 YEARS  QUIT IN 95   Substance and Sexual Activity   • Alcohol use: Yes     Alcohol/week: 1.5 oz     Types: 3 Standard drinks or equivalent per week     Comment: occasionally   • Drug use: Yes     Frequency: 1.0 times per week     Types: Marijuana     Comment: marijuana   • Sexual activity: Not on file     Comment: na   Lifestyle   • Physical activity     Days per week: Not on file     Minutes per session: Not on file   • Stress: Not on file   Relationships   • Social connections     Talks on phone: Not on file     Gets together: Not on file     Attends Anabaptist service: Not on file     Active member of club or organization: Not on file     Attends meetings of clubs or organizations: Not on file     Relationship status: Not on file   • Intimate partner violence     Fear of current or ex partner: Not on file     Emotionally abused: Not on file     Physically abused: Not on file     Forced sexual activity: Not on file   Other Topics Concern   • Not on file   Social History Narrative   • Not on file       Allergies as of 10/07/2020 - Reviewed 10/07/2020   Allergen Reaction Noted   • Nkda [no known drug allergy]  09/10/2007        Vitals:  Vitals:    10/07/20 1055   BP: 134/78   Pulse: 85   SpO2: 97%       Current medications as of today   Current Outpatient Medications   Medication Sig Dispense Refill   • allopurinol (ZYLOPRIM) 300 MG Tab Take 1 tablet by mouth once daily 30 Tab 2   • amLODIPine (NORVASC) 10 MG Tab Take 1 Tab by mouth every day. 90 Tab 1   • omeprazole (PRILOSEC) 20 MG delayed-release capsule Take 20 mg by mouth every day.     • metFORMIN (GLUCOPHAGE) 500 MG Tab Take 500 mg by mouth 2 times a day.     • atorvastatin (LIPITOR) 10 MG Tab Take 10 mg by mouth every evening.     • tadalafil (CIALIS) 5 MG tablet Take 5 mg by mouth every day.     •  lisinopril-hydrochlorothiazide (PRINZIDE) 20-12.5 MG per tablet Take 1 Tab by mouth every day. (Patient not taking: Reported on 10/7/2020) 90 Tab 1     No current facility-administered medications for this visit.          Physical Exam:   Appearance: Well developed, well nourished, no acute distress  Eyes: PERRL, EOM intact, sclera white, conjunctiva moist  Ears: no lesions or deformities  Hearing: grossly intact  Nose: no lesions or deformities  Respiratory effort: no intercostal retractions or use of accessory muscles  Extremities: no cyanosis or edema  Abdomen: soft, large  Gait and Station: normal  Digits and nails: no clubbing, cyanosis, petechiae or nodes.  Cranial nerves: grossly intact  Skin: no visible rashes, lesions or ulcers noted  Orientation: Oriented to time, person and place  Mood and affect: mood and affect appropriate, normal interaction with examiner  Judgement: Intact    Assessment:  1. KIT (obstructive sleep apnea)  DME CPAP   2. Hypertension, unspecified type     3. Atrial fibrillation, unspecified type (HCC)     4. BMI 38.0-38.9,adult           Plan  Discussed the cardiovascular and neuropsychiatric risks of untreated KIT; including but not limited to: HTN, DM, MI, ASCVD, CVA, CHF, traffic accidents.     1. Compliance report is not available for review today as the patient states that he never received a new CPAP in 2018 and he now has not been off of therapy for the last 2 years.   2. DME order ([facility]) for new CPAP 15 cmH2O, mask (FFM or MOC) and supplies was provided today. Continue to clean mask and supplies weekly, and change supplies per insurance guidelines.   3.  Encourage healthy diet and exercise to help with weight loss and management.  4. Follow up with the appropriate healthcare practitioners for all other medical problems and issues.  5. Sleep hygiene discussed.    6.  Continue to follow-up with cardiology Dr. Jurado.  Take blood pressure medication as directed.  Blood pressure  is managed by his PCP.  7. F/u in 3 months.       PRICILLA Guerrero.      This dictation was created using voice recognition software. The accuracy of the dictation is limited to the abilities of the software. I expect there may be some errors of grammar and possibly content.

## 2020-10-26 DIAGNOSIS — I10 ESSENTIAL HYPERTENSION: ICD-10-CM

## 2020-10-26 DIAGNOSIS — I48.91 ATRIAL FIBRILLATION, UNSPECIFIED TYPE (HCC): ICD-10-CM

## 2020-10-28 RX ORDER — LISINOPRIL AND HYDROCHLOROTHIAZIDE 20; 12.5 MG/1; MG/1
TABLET ORAL
Qty: 90 TAB | Refills: 0 | Status: SHIPPED | OUTPATIENT
Start: 2020-10-28 | End: 2021-03-16

## 2020-12-02 ENCOUNTER — TELEPHONE (OUTPATIENT)
Dept: SLEEP MEDICINE | Facility: MEDICAL CENTER | Age: 60
End: 2020-12-02

## 2020-12-02 NOTE — TELEPHONE ENCOUNTER
Order notes and testing faxed to [facility] please call pt and apologize for miscommunication and advise pt order was faxed to [facility] and provide contact info

## 2020-12-02 NOTE — TELEPHONE ENCOUNTER
Pt called in very upset stating when he came in for his appt on 10/7 he was very specific in telling us he did not want to do business with [facility], and he did not want his CPAP order sent to them. In looking In the referral tab that is exactly where his order was sent. Pt stated he would like his order sent somewhere else because [facility] will not stop calling him. Please advise.

## 2020-12-07 ENCOUNTER — TELEPHONE (OUTPATIENT)
Dept: SLEEP MEDICINE | Facility: MEDICAL CENTER | Age: 60
End: 2020-12-07

## 2020-12-07 NOTE — TELEPHONE ENCOUNTER
Caller: Sapna from [facility]    Phone number: [phone]    Message: Sapna from [facility] called and lm.  They received the order for pt's CPAP but pt informed them his insurance is no longer [employer] but has changed to [facility].  We are not in network.      Called pt and lm. Asking pt to return our call regarding his order and his insurance change.       Will wait to send order until we get pt's updated insurance information.

## 2020-12-09 NOTE — TELEPHONE ENCOUNTER
Verified pt's new P insurance and updated registration tab  Pt has opted to use [facility]     Order, notes and SS faxed to [facility]    Pt is aware that if he is unhappy with [facility], he does not have to stay with them.

## 2020-12-18 NOTE — TELEPHONE ENCOUNTER
Patient called and LVM requesting a copy of his CPAP order.     Called patient and informed I would send a copy via mail. He said he would like to pay out of pocket for his CPAP. [facility] is asking for $400 up front and $64 each month and he would rather buy himself.     CPAP order mail to address on file.

## 2021-03-15 DIAGNOSIS — I48.91 ATRIAL FIBRILLATION, UNSPECIFIED TYPE (HCC): ICD-10-CM

## 2021-03-15 DIAGNOSIS — I10 ESSENTIAL HYPERTENSION: ICD-10-CM

## 2021-03-16 RX ORDER — LISINOPRIL AND HYDROCHLOROTHIAZIDE 20; 12.5 MG/1; MG/1
TABLET ORAL
Qty: 30 TABLET | Refills: 0 | Status: SHIPPED | OUTPATIENT
Start: 2021-03-16

## 2021-06-05 ENCOUNTER — APPOINTMENT (OUTPATIENT)
Dept: RADIOLOGY | Facility: MEDICAL CENTER | Age: 61
End: 2021-06-05
Attending: EMERGENCY MEDICINE
Payer: COMMERCIAL

## 2021-06-05 ENCOUNTER — HOSPITAL ENCOUNTER (EMERGENCY)
Facility: MEDICAL CENTER | Age: 61
End: 2021-06-05
Attending: EMERGENCY MEDICINE
Payer: COMMERCIAL

## 2021-06-05 ENCOUNTER — OFFICE VISIT (OUTPATIENT)
Dept: URGENT CARE | Facility: PHYSICIAN GROUP | Age: 61
End: 2021-06-05
Payer: COMMERCIAL

## 2021-06-05 VITALS
SYSTOLIC BLOOD PRESSURE: 112 MMHG | TEMPERATURE: 96.9 F | WEIGHT: 315 LBS | RESPIRATION RATE: 18 BRPM | OXYGEN SATURATION: 93 % | BODY MASS INDEX: 37.19 KG/M2 | DIASTOLIC BLOOD PRESSURE: 77 MMHG | HEART RATE: 80 BPM | HEIGHT: 77 IN

## 2021-06-05 VITALS
SYSTOLIC BLOOD PRESSURE: 132 MMHG | DIASTOLIC BLOOD PRESSURE: 82 MMHG | HEIGHT: 77 IN | WEIGHT: 315 LBS | TEMPERATURE: 98 F | BODY MASS INDEX: 37.19 KG/M2 | RESPIRATION RATE: 16 BRPM | HEART RATE: 97 BPM | OXYGEN SATURATION: 92 %

## 2021-06-05 DIAGNOSIS — R63.5 UNEXPLAINED WEIGHT GAIN: ICD-10-CM

## 2021-06-05 DIAGNOSIS — M54.50 ACUTE RIGHT-SIDED LOW BACK PAIN WITHOUT SCIATICA: ICD-10-CM

## 2021-06-05 DIAGNOSIS — R60.0 BILATERAL LOWER EXTREMITY EDEMA: ICD-10-CM

## 2021-06-05 DIAGNOSIS — R60.9 PERIPHERAL EDEMA: ICD-10-CM

## 2021-06-05 LAB
ALBUMIN SERPL BCP-MCNC: 4.2 G/DL (ref 3.2–4.9)
ALBUMIN/GLOB SERPL: 1.6 G/DL
ALP SERPL-CCNC: 91 U/L (ref 30–99)
ALT SERPL-CCNC: 28 U/L (ref 2–50)
ANION GAP SERPL CALC-SCNC: 9 MMOL/L (ref 7–16)
APPEARANCE UR: CLEAR
AST SERPL-CCNC: 19 U/L (ref 12–45)
BASOPHILS # BLD AUTO: 0.3 % (ref 0–1.8)
BASOPHILS # BLD: 0.02 K/UL (ref 0–0.12)
BILIRUB SERPL-MCNC: 0.4 MG/DL (ref 0.1–1.5)
BILIRUB UR QL STRIP.AUTO: NEGATIVE
BUN SERPL-MCNC: 31 MG/DL (ref 8–22)
CALCIUM SERPL-MCNC: 9.4 MG/DL (ref 8.4–10.2)
CHLORIDE SERPL-SCNC: 103 MMOL/L (ref 96–112)
CO2 SERPL-SCNC: 24 MMOL/L (ref 20–33)
COLOR UR: YELLOW
CREAT SERPL-MCNC: 1.2 MG/DL (ref 0.5–1.4)
EKG IMPRESSION: NORMAL
EOSINOPHIL # BLD AUTO: 0.31 K/UL (ref 0–0.51)
EOSINOPHIL NFR BLD: 5 % (ref 0–6.9)
ERYTHROCYTE [DISTWIDTH] IN BLOOD BY AUTOMATED COUNT: 46.3 FL (ref 35.9–50)
GLOBULIN SER CALC-MCNC: 2.6 G/DL (ref 1.9–3.5)
GLUCOSE SERPL-MCNC: 104 MG/DL (ref 65–99)
GLUCOSE UR STRIP.AUTO-MCNC: NEGATIVE MG/DL
HCT VFR BLD AUTO: 46.4 % (ref 42–52)
HGB BLD-MCNC: 15.3 G/DL (ref 14–18)
IMM GRANULOCYTES # BLD AUTO: 0.03 K/UL (ref 0–0.11)
IMM GRANULOCYTES NFR BLD AUTO: 0.5 % (ref 0–0.9)
KETONES UR STRIP.AUTO-MCNC: NEGATIVE MG/DL
LEUKOCYTE ESTERASE UR QL STRIP.AUTO: NEGATIVE
LYMPHOCYTES # BLD AUTO: 1.52 K/UL (ref 1–4.8)
LYMPHOCYTES NFR BLD: 24.4 % (ref 22–41)
MCH RBC QN AUTO: 29.7 PG (ref 27–33)
MCHC RBC AUTO-ENTMCNC: 33 G/DL (ref 33.7–35.3)
MCV RBC AUTO: 89.9 FL (ref 81.4–97.8)
MICRO URNS: NORMAL
MONOCYTES # BLD AUTO: 0.57 K/UL (ref 0–0.85)
MONOCYTES NFR BLD AUTO: 9.2 % (ref 0–13.4)
NEUTROPHILS # BLD AUTO: 3.77 K/UL (ref 1.82–7.42)
NEUTROPHILS NFR BLD: 60.6 % (ref 44–72)
NITRITE UR QL STRIP.AUTO: NEGATIVE
NRBC # BLD AUTO: 0 K/UL
NRBC BLD-RTO: 0 /100 WBC
NT-PROBNP SERPL IA-MCNC: 10 PG/ML (ref 0–125)
PH UR STRIP.AUTO: 5 [PH] (ref 5–8)
PLATELET # BLD AUTO: 185 K/UL (ref 164–446)
PMV BLD AUTO: 9.7 FL (ref 9–12.9)
POTASSIUM SERPL-SCNC: 4.2 MMOL/L (ref 3.6–5.5)
PROT SERPL-MCNC: 6.8 G/DL (ref 6–8.2)
PROT UR QL STRIP: NEGATIVE MG/DL
RBC # BLD AUTO: 5.16 M/UL (ref 4.7–6.1)
RBC UR QL AUTO: NEGATIVE
SODIUM SERPL-SCNC: 136 MMOL/L (ref 135–145)
SP GR UR STRIP.AUTO: 1.02
T4 FREE SERPL-MCNC: 1.11 NG/DL (ref 0.93–1.7)
TSH SERPL DL<=0.005 MIU/L-ACNC: 0.88 UIU/ML (ref 0.38–5.33)
WBC # BLD AUTO: 6.2 K/UL (ref 4.8–10.8)

## 2021-06-05 PROCEDURE — 99214 OFFICE O/P EST MOD 30 MIN: CPT | Performed by: NURSE PRACTITIONER

## 2021-06-05 PROCEDURE — 80053 COMPREHEN METABOLIC PANEL: CPT

## 2021-06-05 PROCEDURE — 85025 COMPLETE CBC W/AUTO DIFF WBC: CPT

## 2021-06-05 PROCEDURE — 84439 ASSAY OF FREE THYROXINE: CPT

## 2021-06-05 PROCEDURE — 71045 X-RAY EXAM CHEST 1 VIEW: CPT

## 2021-06-05 PROCEDURE — 99283 EMERGENCY DEPT VISIT LOW MDM: CPT

## 2021-06-05 PROCEDURE — 93005 ELECTROCARDIOGRAM TRACING: CPT | Performed by: EMERGENCY MEDICINE

## 2021-06-05 PROCEDURE — 81003 URINALYSIS AUTO W/O SCOPE: CPT

## 2021-06-05 PROCEDURE — 84443 ASSAY THYROID STIM HORMONE: CPT

## 2021-06-05 PROCEDURE — 83880 ASSAY OF NATRIURETIC PEPTIDE: CPT

## 2021-06-05 PROCEDURE — 72100 X-RAY EXAM L-S SPINE 2/3 VWS: CPT

## 2021-06-05 RX ORDER — CYCLOBENZAPRINE HCL 10 MG
10 TABLET ORAL 3 TIMES DAILY PRN
Qty: 30 TABLET | Refills: 0 | Status: SHIPPED | OUTPATIENT
Start: 2021-06-05 | End: 2021-10-11

## 2021-06-05 RX ORDER — NAPROXEN SODIUM 220 MG
440 TABLET ORAL
COMMUNITY

## 2021-06-05 ASSESSMENT — ENCOUNTER SYMPTOMS
CLAUDICATION: 0
DIZZINESS: 0
PND: 0
ORTHOPNEA: 1
NAUSEA: 0
FEVER: 0
COUGH: 0
VOMITING: 0
WHEEZING: 0
CHILLS: 0
PALPITATIONS: 0
SENSORY CHANGE: 0
TINGLING: 0
HEADACHES: 0
ABDOMINAL PAIN: 0
SHORTNESS OF BREATH: 1
BACK PAIN: 1

## 2021-06-05 ASSESSMENT — FIBROSIS 4 INDEX
FIB4 SCORE: 1.35
FIB4 SCORE: 1.35

## 2021-06-05 ASSESSMENT — PAIN DESCRIPTION - DESCRIPTORS: DESCRIPTORS: ACHING

## 2021-06-05 NOTE — ED NOTES
Discharge instructions given and discussed.  Pt educated to come back to ER for new or worsening symptoms and follow up with PCP as instructed. Pt verbalized understanding. VSS. Pt  Discharged in stable condition.

## 2021-06-05 NOTE — PROGRESS NOTES
Subjective:   Bg Edwards is a 61 y.o. male who presents for Leg Swelling (x2 weeks, Pt states kidney pain, swelling in both legs)      HPI  61 year old male in urgent care for new onset bilateral leg swelling for 2 weeks as well as kidney pain. Patient has history of AFIB, Hypertension, SOB, obesity and KIT.     -Patient states in the last 2 weeks he has gained between 15 and 20 pounds and has noticed that his swelling is worse as the day progresses.  Denies any shortness of breath out of the ordinary, chest pain, numbness, tingling.  Also states he has increased his alcohol consumption up to 6-7 shots of tequila per night as he is trying to quit smoking marijuana.      Patient feels as though he has caused his weight gain with his drinking.  Denies previous issues with kidney function, liver function. Denies dysuria, hematuria fever, chills. States that his flank pain is dull and achy and increases with position changes and lying on his stomach. Patient states that the pain lessens with rest when he is in a reclined position.     Review of Systems   Constitutional: Negative for chills, fever and malaise/fatigue.   Respiratory: Positive for shortness of breath. Negative for cough and wheezing.    Cardiovascular: Positive for orthopnea and leg swelling. Negative for chest pain, palpitations, claudication and PND.   Gastrointestinal: Negative for abdominal pain, nausea and vomiting.   Genitourinary: Negative for dysuria, frequency, hematuria and urgency.   Musculoskeletal: Positive for back pain.   Neurological: Negative for dizziness, tingling, sensory change and headaches.       Patient Active Problem List   Diagnosis   • HTN (hypertension)   • SOB (shortness of breath)   • Atrial fibrillation (HCC)   • Chronic gout   • Morbid obesity with BMI of 40.0-44.9, adult (HCC)   • Mixed hyperlipidemia   • KIT (obstructive sleep apnea)   • Pre-operative cardiovascular examination   • Nonspecific abnormal  "electrocardiogram (ECG) (EKG)   • Elevated fasting blood sugar     Past Surgical History:   Procedure Laterality Date   • FOREIGN BODY REMOVAL  2010    Performed by BRI HICKS at SURGERY SAME DAY [facility]   • LUMBAR LAMINECTOMY DISKECTOMY Bilateral    • OTHER ORTHOPEDIC SURGERY  AC RECONSTRUCTION   • OTHER ORTHOPEDIC SURGERY  LEFT THUMB   • TONSILLECTOMY     • VASECTOMY       Social History     Tobacco Use   • Smoking status: Former Smoker     Types: Cigars     Quit date:      Years since quittin.4   • Smokeless tobacco: Former User     Types: Chew     Quit date: 2021   • Tobacco comment: 15 YEARS  QUIT IN 95   Vaping Use   • Vaping Use: Never used   Substance Use Topics   • Alcohol use: Yes     Alcohol/week: 1.5 oz     Types: 3 Standard drinks or equivalent per week     Comment: occasionally   • Drug use: Yes     Frequency: 1.0 times per week     Types: Marijuana     Comment: marijuana      Family History   Problem Relation Age of Onset   • Heart Attack Father    • Sleep Apnea Father       (Allergies, Medications, & Tobacco/Substance Use were reconciled by the Medical Assistant and reviewed by myself. The family history is prepopulated)     Objective:     /82   Pulse 97   Temp 36.7 °C (98 °F) (Temporal)   Resp 16   Ht 1.956 m (6' 5\")   Wt (!) 151 kg (332 lb)   SpO2 92%   BMI 39.37 kg/m²     Physical Exam  Vitals reviewed.   Constitutional:       Appearance: Normal appearance.   Cardiovascular:      Rate and Rhythm: Normal rate and regular rhythm.      Pulses:           Dorsalis pedis pulses are 2+ on the left side.        Posterior tibial pulses are 2+ on the left side.      Heart sounds: Normal heart sounds, S1 normal and S2 normal.   Pulmonary:      Effort: Pulmonary effort is normal.      Breath sounds: Normal breath sounds.   Abdominal:      General: Abdomen is flat. Bowel sounds are normal. There is distension.      Palpations: Abdomen is soft. There is no mass.      " Tenderness: There is no abdominal tenderness. There is no right CVA tenderness, left CVA tenderness, guarding or rebound.      Hernia: No hernia is present.      Comments: Unable to determine fluid wave due to body habitus    Musculoskeletal:      Right lower le+ Pitting Edema present.      Left lower le+ Pitting Edema present.   Skin:     General: Skin is warm.   Neurological:      Mental Status: He is alert and oriented to person, place, and time.   Psychiatric:         Mood and Affect: Mood normal.         Behavior: Behavior normal.         Thought Content: Thought content normal.         Judgment: Judgment normal.         Assessment/Plan:     1. Bilateral lower extremity edema     2. Unexplained weight gain       Discussed physical examination findings as well as patient presentation, length patient symptoms, recent weight gain and increase in alcohol consumption is concerning for possible cardiac, liver etiology. Discused outpatient work up would be extensive and not attainable out of the urgent care and I am concerned due to the rapid weight gain that he needs higher level intervention. Advised that he needs to go to the emergency department for further work-up and evaluation as an extensive work-up is not possible out of the urgent care.     Patient expressed understanding agrees to plan is no further questions at this time.  He agrees to go via private vehicle.  I feel that he is stable enough to go without ambulance    Differential diagnosis, natural history, supportive care, and indications for immediate follow-up discussed.    Advised the patient to follow-up with the primary care physician for recheck, reevaluation, and consideration of further management.    Please note that this dictation was created using voice recognition software. I have made a reasonable attempt to correct obvious errors, but I expect that there are errors of grammar and possibly content that I did not discover before  finalizing the note.    This note was electronically signed ROSAURA Nguyen

## 2021-06-05 NOTE — ED PROVIDER NOTES
"ED Provider Note    CHIEF COMPLAINT  Chief Complaint   Patient presents with   • Flank Pain   • Peripheral Edema       HPI  Bg Edwards is a 61 y.o. male with a history of A. Fib status post cardioversion, diabetes, hypertension, and dyslipidemia who presents complaining of peripheral edema and right sided back/flank pain.    Patient states he noted pain in his \"kidney\" 2 weeks ago.  At that time he also noted increasing peripheral edema in his bilateral lower extremities.  He states he started a new job 6 weeks ago and has been drinking \"more than I ever have in my life.\"  He goes through fifth every 2 days.  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  He reports an increased weight gain of 20 pounds in the last several weeks.  He denies any changes in his appetite.    His right \"kidney pain\" is dull, achy, increases with standing and lying on his stomach which is how he chooses to sleep and decreases with massage and sitting in a recliner in a certain position.  He denies radiation of this pain into his lower extremities, weakness, numbness, trauma, hematuria, dysuria, history of kidney stones.  Patient has taking Aleve in the last 3 days with mild relief.    Patient states the peripheral edema in his legs makes him feel heavy but denies calf pain, history of PE/DVT, trauma.  He reports dyspnea on exertion after walking up several flights of stairs but denies chest pain.      ALLERGIES  Allergies   Allergen Reactions   • Nkda [No Known Drug Allergy]        CURRENT MEDICATIONS  Home Medications     Reviewed by Brandon Mejia (Pharmacy Tech) on 06/05/21 at 1251  Med List Status: Complete   Medication Last Dose Status   allopurinol (ZYLOPRIM) 300 MG Tab 6/5/2021 Active   amLODIPine (NORVASC) 10 MG Tab 6/5/2021 Active   atorvastatin (LIPITOR) 10 MG Tab 6/5/2021 Active   lisinopril-hydrochlorothiazide (PRINZIDE) 20-12.5 MG per tablet " "2021 Active   metFORMIN (GLUCOPHAGE) 500 MG Tab 2021 Active   MILK THISTLE PO 2021 Active   naproxen (ALEVE) 220 MG tablet FEW DAYS AGO Active   omeprazole (PRILOSEC) 20 MG delayed-release capsule FEW DAYS AGO Active   tadalafil (CIALIS) 5 MG tablet 2021 Active                PAST MEDICAL HISTORY   has a past medical history of Abnormal EKG, Atrial fibrillation (HCC), Chickenpox, Diabetes (HCC), Dyspnea, Yi measles, Hypertension, Influenza, Mixed hyperlipidemia (10/1/2018), Pain (6), Paroxysmal atrial fibrillation (HCC), Snoring, and Tonsillitis.    SURGICAL HISTORY   has a past surgical history that includes tonsillectomy; vasectomy; other orthopedic surgery (AC RECONSTRUCTION); other orthopedic surgery (LEFT THUMB); foreign body removal (2010); and lumbar laminectomy diskectomy (Bilateral).    SOCIAL HISTORY  Social History     Tobacco Use   • Smoking status: Former Smoker     Types: Cigars     Quit date:      Years since quittin.4   • Smokeless tobacco: Former User     Types: Chew     Quit date: 2021   • Tobacco comment: 15 YEARS  QUIT IN 95   Vaping Use   • Vaping Use: Never used   Substance and Sexual Activity   • Alcohol use: Yes     Alcohol/week: 1.5 oz     Types: 3 Standard drinks or equivalent per week     Comment: Daily    • Drug use: Yes     Frequency: 1.0 times per week     Types: Marijuana     Comment: Marijuana   • Sexual activity: Not on file     Comment: na       Family Hx:  Hypertension, CAD, prostate cancer      REVIEW OF SYSTEMS  See HPI for further details.  All other systems are negative except as above in HPI.      PHYSICAL EXAM  VITAL SIGNS: /77   Pulse 80   Temp 36.1 °C (96.9 °F) (Temporal)   Resp 18   Ht 1.956 m (6' 5\")   Wt (!) 151 kg (332 lb 14.3 oz)   SpO2 93%   BMI 39.48 kg/m²     General:  WD male with elevated BMI, nontoxic appearing in NAD; A+Ox3; V/S as above; afebrile, not tachycardic  Skin: warm and dry; good color; no " rash  HEENT: NCAT; EOMs intact; PERRL; no scleral icterus   Neck: FROM; soft  Cardiovascular: Regular heart rate and rhythm.  No murmurs, rubs, or gallops; pulses 2+ bilaterally radially and DP areas  Lungs: Clear to auscultation with good air movement bilaterally.  No wheezes, rhonchi, or rales.   Abdomen: BS present; soft; NTND; no rebound, guarding, or rigidity.  No organomegaly or pulsatile mass; no CVAT; no mottling  Back: No midline vertebral point tenderness  Extremities: ESCOBEDO x 4; no e/o trauma; 1+ pitting lower extremity edema up to the mid calf area; neg Cornelio's  Neurologic: CNs III-XII grossly intact; speech clear; distal sensation intact; strength 5/5 UE/LEs  Psychiatric: Appropriate affect, normal mood    LABS  Results for orders placed or performed during the hospital encounter of 06/05/21   CBC WITH DIFFERENTIAL   Result Value Ref Range    WBC 6.2 4.8 - 10.8 K/uL    RBC 5.16 4.70 - 6.10 M/uL    Hemoglobin 15.3 14.0 - 18.0 g/dL    Hematocrit 46.4 42.0 - 52.0 %    MCV 89.9 81.4 - 97.8 fL    MCH 29.7 27.0 - 33.0 pg    MCHC 33.0 (L) 33.7 - 35.3 g/dL    RDW 46.3 35.9 - 50.0 fL    Platelet Count 185 164 - 446 K/uL    MPV 9.7 9.0 - 12.9 fL    Neutrophils-Polys 60.60 44.00 - 72.00 %    Lymphocytes 24.40 22.00 - 41.00 %    Monocytes 9.20 0.00 - 13.40 %    Eosinophils 5.00 0.00 - 6.90 %    Basophils 0.30 0.00 - 1.80 %    Immature Granulocytes 0.50 0.00 - 0.90 %    Nucleated RBC 0.00 /100 WBC    Neutrophils (Absolute) 3.77 1.82 - 7.42 K/uL    Lymphs (Absolute) 1.52 1.00 - 4.80 K/uL    Monos (Absolute) 0.57 0.00 - 0.85 K/uL    Eos (Absolute) 0.31 0.00 - 0.51 K/uL    Baso (Absolute) 0.02 0.00 - 0.12 K/uL    Immature Granulocytes (abs) 0.03 0.00 - 0.11 K/uL    NRBC (Absolute) 0.00 K/uL   CMP   Result Value Ref Range    Sodium 136 135 - 145 mmol/L    Potassium 4.2 3.6 - 5.5 mmol/L    Chloride 103 96 - 112 mmol/L    Co2 24 20 - 33 mmol/L    Anion Gap 9.0 7.0 - 16.0    Glucose 104 (H) 65 - 99 mg/dL    Bun 31 (H) 8 - 22  mg/dL    Creatinine 1.20 0.50 - 1.40 mg/dL    Calcium 9.4 8.4 - 10.2 mg/dL    AST(SGOT) 19 12 - 45 U/L    ALT(SGPT) 28 2 - 50 U/L    Alkaline Phosphatase 91 30 - 99 U/L    Total Bilirubin 0.4 0.1 - 1.5 mg/dL    Albumin 4.2 3.2 - 4.9 g/dL    Total Protein 6.8 6.0 - 8.2 g/dL    Globulin 2.6 1.9 - 3.5 g/dL    A-G Ratio 1.6 g/dL   proBrain Natriuretic Peptide, NT   Result Value Ref Range    NT-proBNP 10 0 - 125 pg/mL   TSH   Result Value Ref Range    TSH 0.877 0.380 - 5.330 uIU/mL   FREE THYROXINE   Result Value Ref Range    Free T-4 1.11 0.93 - 1.70 ng/dL   URINALYSIS    Specimen: Urine   Result Value Ref Range    Color Yellow     Character Clear     Specific Gravity 1.025 <1.035    Ph 5.0 5.0 - 8.0    Glucose Negative Negative mg/dL    Ketones Negative Negative mg/dL    Protein Negative Negative mg/dL    Bilirubin Negative Negative    Nitrite Negative Negative    Leukocyte Esterase Negative Negative    Occult Blood Negative Negative    Micro Urine Req see below    ESTIMATED GFR   Result Value Ref Range    GFR If African American >60 >60 mL/min/1.73 m 2    GFR If Non African American >60 >60 mL/min/1.73 m 2   EKG   Result Value Ref Range    Report       [facility]    Test Date:  2021  Pt Name:    ANANTH SOLANO            Department: [facility]  MRN:        [id number]                      [facility]  Gender:     Male                         Technician: [id number]  :        1960                   Requested By:JAMES DANIEL  Order #:    [id number]                    Reading MD: JAMES DANIEL MD    Measurements  Intervals                                Axis  Rate:       82                           P:          48  NE:         237                          QRS:        -42  QRSD:       105                          T:          36  QT:         387  QTc:        452    Interpretive Statements  Sinus rhythm  Prolonged NE interval  Probable left atrial enlargement  Left  axis deviation  Compared to ECG 07/20/2020 14:24:22  Left-axis deviation now present  Ventricular premature complex(es) no longer present  Left bundle-branch block no longer present  Electronically Signed On 6-5-2021 12 :11:35 PDT by JAMES DANIEL MD             IMAGING  DX-LUMBAR SPINE-2 OR 3 VIEWS   Final Result      1.  There is multilevel degenerative disc disease in the lumbar spine most prominent in the lower 3 levels of the lumbar spine.      DX-CHEST-LIMITED (1 VIEW)   Final Result      Negative single view of the chest.            MEDICAL RECORD  I have reviewed patient's medical record and pertinent results are listed below.      COURSE & MEDICAL DECISION MAKING  I have reviewed any medical record information, laboratory studies and radiographic results as noted.    Bg Edwards is a 61 y.o. male who presents complaining of right lumbar/flank pain which has been constant for 2 weeks.  Increased with movement and prolonged standing.  I suspect a musculoskeletal issue.  The patient was concerned that this may be kidney pain and in conjunction with his peripheral edema he was having kidney issues.    Patient denies any red flag signs or symptoms with regards to back pain.  I do not suspect epidural abscess or cauda equina syndrome.    Appropriate PPE was worn at all times while interacting with the patient, including goggles, N95 mask, and surgical mask.    EKG demonstrates sinus rhythm with a prolonged MN interval.  No significant ST changes.  Patient is not currently in A. Fib.    Labs demonstrate a normal urinalysis, normal TSH, CMP with slight elevation of glucose to 104 and a BUN of 31 and a normal CBC.    Chest x-ray is negative.    LS spine demonstrates degenerative disc disease in the area of concern.  This is likely what is causing the patient's pain.    I discussed these results with the patient and his wife.  He admits he has been drinking in greater quantities and we discussed the  effects of this.  He will try to cut down.  He will follow-up with his PCP on Monday.  I suggested a Lidoderm patch, Tylenol, and ibuprofen along with Flexeril for his back pain.  We discussed compression stockings/socks for his peripheral edema and reducing the amount he is standing.  We discussed return precautions.  Patient is amenable to this plan.      FINAL IMPRESSION  1. Peripheral edema     2. Acute right-sided low back pain without sciatica       Electronically signed by: Carmen Kan M.D., 6/5/2021 11:34 AM

## 2021-06-05 NOTE — DISCHARGE INSTRUCTIONS
Return to the ER for chest pain, difficulty breathing, fever, or other concerns    Follow-up with Dr. Reeves Monday    Elevate your legs, wear compression stockings/socks, and consider reducing your alcohol intake

## 2021-06-05 NOTE — ED TRIAGE NOTES
"Pt is referred to our facility from [facility] for further evaluation and management of peripheral edema recurring for the past 2 weeks, and right flank pain without any N/V/D.  Chief Complaint   Patient presents with   • Flank Pain   • Peripheral Edema     /90   Pulse 89   Temp 36.1 °C (96.9 °F) (Temporal)   Resp 20   Ht 1.956 m (6' 5\")   Wt (!) 151 kg (332 lb 14.3 oz)   SpO2 92%   BMI 39.48 kg/m²       "

## 2021-08-18 DIAGNOSIS — M54.50 ACUTE RIGHT-SIDED LOW BACK PAIN WITHOUT SCIATICA: ICD-10-CM

## 2021-08-18 RX ORDER — METAXALONE 800 MG/1
800 TABLET ORAL 3 TIMES DAILY PRN
Qty: 30 TABLET | Refills: 0 | Status: SHIPPED | OUTPATIENT
Start: 2021-08-18 | End: 2021-10-11

## 2021-08-18 RX ORDER — PREDNISONE 10 MG/1
60 TABLET ORAL DAILY
Qty: 30 TABLET | Refills: 0 | Status: SHIPPED | OUTPATIENT
Start: 2021-08-18 | End: 2021-08-23

## 2021-10-11 ENCOUNTER — APPOINTMENT (OUTPATIENT)
Dept: RADIOLOGY | Facility: MEDICAL CENTER | Age: 61
End: 2021-10-11
Attending: EMERGENCY MEDICINE
Payer: COMMERCIAL

## 2021-10-11 ENCOUNTER — APPOINTMENT (OUTPATIENT)
Dept: CARDIOLOGY | Facility: MEDICAL CENTER | Age: 61
End: 2021-10-11
Attending: INTERNAL MEDICINE
Payer: COMMERCIAL

## 2021-10-11 ENCOUNTER — HOSPITAL ENCOUNTER (EMERGENCY)
Facility: MEDICAL CENTER | Age: 61
End: 2021-10-11
Attending: EMERGENCY MEDICINE
Payer: COMMERCIAL

## 2021-10-11 VITALS
WEIGHT: 315 LBS | HEIGHT: 78 IN | DIASTOLIC BLOOD PRESSURE: 86 MMHG | HEART RATE: 92 BPM | SYSTOLIC BLOOD PRESSURE: 140 MMHG | OXYGEN SATURATION: 90 % | TEMPERATURE: 97.6 F | RESPIRATION RATE: 16 BRPM | BODY MASS INDEX: 36.45 KG/M2

## 2021-10-11 DIAGNOSIS — J98.01 ACUTE BRONCHOSPASM: ICD-10-CM

## 2021-10-11 LAB
ALBUMIN SERPL BCP-MCNC: 4.9 G/DL (ref 3.2–4.9)
ALBUMIN/GLOB SERPL: 1.8 G/DL
ALP SERPL-CCNC: 77 U/L (ref 30–99)
ALT SERPL-CCNC: 33 U/L (ref 2–50)
ANION GAP SERPL CALC-SCNC: 9 MMOL/L (ref 7–16)
AST SERPL-CCNC: 32 U/L (ref 12–45)
BASOPHILS # BLD AUTO: 0.4 % (ref 0–1.8)
BASOPHILS # BLD: 0.03 K/UL (ref 0–0.12)
BILIRUB SERPL-MCNC: 0.5 MG/DL (ref 0.1–1.5)
BUN SERPL-MCNC: 17 MG/DL (ref 8–22)
CALCIUM SERPL-MCNC: 10.1 MG/DL (ref 8.4–10.2)
CHLORIDE SERPL-SCNC: 102 MMOL/L (ref 96–112)
CO2 SERPL-SCNC: 30 MMOL/L (ref 20–33)
CREAT SERPL-MCNC: 1.14 MG/DL (ref 0.5–1.4)
EKG IMPRESSION: NORMAL
EOSINOPHIL # BLD AUTO: 0.72 K/UL (ref 0–0.51)
EOSINOPHIL NFR BLD: 10.5 % (ref 0–6.9)
ERYTHROCYTE [DISTWIDTH] IN BLOOD BY AUTOMATED COUNT: 46.9 FL (ref 35.9–50)
FLUAV RNA SPEC QL NAA+PROBE: NEGATIVE
FLUBV RNA SPEC QL NAA+PROBE: NEGATIVE
GLOBULIN SER CALC-MCNC: 2.8 G/DL (ref 1.9–3.5)
GLUCOSE SERPL-MCNC: 103 MG/DL (ref 65–99)
HCT VFR BLD AUTO: 49.8 % (ref 42–52)
HGB BLD-MCNC: 16.2 G/DL (ref 14–18)
IMM GRANULOCYTES # BLD AUTO: 0.02 K/UL (ref 0–0.11)
IMM GRANULOCYTES NFR BLD AUTO: 0.3 % (ref 0–0.9)
LV EJECT FRACT  99904: 60
LV EJECT FRACT MOD 2C 99903: 51.78
LV EJECT FRACT MOD 4C 99902: 60.78
LV EJECT FRACT MOD BP 99901: 57.31
LYMPHOCYTES # BLD AUTO: 1.28 K/UL (ref 1–4.8)
LYMPHOCYTES NFR BLD: 18.7 % (ref 22–41)
MCH RBC QN AUTO: 29.9 PG (ref 27–33)
MCHC RBC AUTO-ENTMCNC: 32.5 G/DL (ref 33.7–35.3)
MCV RBC AUTO: 91.9 FL (ref 81.4–97.8)
MONOCYTES # BLD AUTO: 0.59 K/UL (ref 0–0.85)
MONOCYTES NFR BLD AUTO: 8.6 % (ref 0–13.4)
NEUTROPHILS # BLD AUTO: 4.22 K/UL (ref 1.82–7.42)
NEUTROPHILS NFR BLD: 61.5 % (ref 44–72)
NRBC # BLD AUTO: 0 K/UL
NRBC BLD-RTO: 0 /100 WBC
NT-PROBNP SERPL IA-MCNC: 46 PG/ML (ref 0–125)
PLATELET # BLD AUTO: 217 K/UL (ref 164–446)
PMV BLD AUTO: 10 FL (ref 9–12.9)
POTASSIUM SERPL-SCNC: 4.4 MMOL/L (ref 3.6–5.5)
PROT SERPL-MCNC: 7.7 G/DL (ref 6–8.2)
RBC # BLD AUTO: 5.42 M/UL (ref 4.7–6.1)
RSV RNA SPEC QL NAA+PROBE: NEGATIVE
SARS-COV-2 RNA RESP QL NAA+PROBE: NOTDETECTED
SODIUM SERPL-SCNC: 141 MMOL/L (ref 135–145)
SPECIMEN SOURCE: NORMAL
TROPONIN T SERPL-MCNC: 24 NG/L (ref 6–19)
TROPONIN T SERPL-MCNC: 25 NG/L (ref 6–19)
WBC # BLD AUTO: 6.9 K/UL (ref 4.8–10.8)

## 2021-10-11 PROCEDURE — 83880 ASSAY OF NATRIURETIC PEPTIDE: CPT

## 2021-10-11 PROCEDURE — 99284 EMERGENCY DEPT VISIT MOD MDM: CPT

## 2021-10-11 PROCEDURE — 94640 AIRWAY INHALATION TREATMENT: CPT

## 2021-10-11 PROCEDURE — A9270 NON-COVERED ITEM OR SERVICE: HCPCS | Performed by: EMERGENCY MEDICINE

## 2021-10-11 PROCEDURE — 93005 ELECTROCARDIOGRAM TRACING: CPT

## 2021-10-11 PROCEDURE — 84484 ASSAY OF TROPONIN QUANT: CPT

## 2021-10-11 PROCEDURE — 94760 N-INVAS EAR/PLS OXIMETRY 1: CPT

## 2021-10-11 PROCEDURE — 93306 TTE W/DOPPLER COMPLETE: CPT

## 2021-10-11 PROCEDURE — 700102 HCHG RX REV CODE 250 W/ 637 OVERRIDE(OP): Performed by: EMERGENCY MEDICINE

## 2021-10-11 PROCEDURE — 36415 COLL VENOUS BLD VENIPUNCTURE: CPT

## 2021-10-11 PROCEDURE — 0241U HCHG SARS-COV-2 COVID-19 NFCT DS RESP RNA 4 TRGT MIC: CPT

## 2021-10-11 PROCEDURE — 700111 HCHG RX REV CODE 636 W/ 250 OVERRIDE (IP): Performed by: EMERGENCY MEDICINE

## 2021-10-11 PROCEDURE — 85025 COMPLETE CBC W/AUTO DIFF WBC: CPT

## 2021-10-11 PROCEDURE — 93306 TTE W/DOPPLER COMPLETE: CPT | Mod: 26 | Performed by: INTERNAL MEDICINE

## 2021-10-11 PROCEDURE — 71045 X-RAY EXAM CHEST 1 VIEW: CPT

## 2021-10-11 PROCEDURE — 80053 COMPREHEN METABOLIC PANEL: CPT

## 2021-10-11 PROCEDURE — 700101 HCHG RX REV CODE 250: Performed by: EMERGENCY MEDICINE

## 2021-10-11 RX ORDER — ALBUTEROL SULFATE 90 UG/1
2 AEROSOL, METERED RESPIRATORY (INHALATION) EVERY 6 HOURS PRN
Qty: 8.5 G | Refills: 0 | Status: SHIPPED | OUTPATIENT
Start: 2021-10-11

## 2021-10-11 RX ORDER — PREDNISONE 50 MG/1
50 TABLET ORAL ONCE
Status: DISCONTINUED | OUTPATIENT
Start: 2021-10-11 | End: 2021-10-11 | Stop reason: HOSPADM

## 2021-10-11 RX ORDER — MONTELUKAST SODIUM 4 MG/1
4 TABLET, CHEWABLE ORAL DAILY
Qty: 30 TABLET | Refills: 0 | Status: SHIPPED | OUTPATIENT
Start: 2021-10-11

## 2021-10-11 RX ORDER — PREDNISONE 20 MG/1
40 TABLET ORAL DAILY
Qty: 10 TABLET | Refills: 0 | Status: SHIPPED | OUTPATIENT
Start: 2021-10-12 | End: 2021-10-17

## 2021-10-11 RX ORDER — GLIMEPIRIDE 1 MG/1
1 TABLET ORAL EVERY MORNING
COMMUNITY

## 2021-10-11 RX ORDER — ALBUTEROL SULFATE 90 UG/1
4 AEROSOL, METERED RESPIRATORY (INHALATION)
Status: DISCONTINUED | OUTPATIENT
Start: 2021-10-11 | End: 2021-10-11 | Stop reason: HOSPADM

## 2021-10-11 RX ADMIN — ALBUTEROL SULFATE 4 PUFF: 90 AEROSOL, METERED RESPIRATORY (INHALATION) at 10:37

## 2021-10-11 RX ADMIN — PREDNISONE 60 MG: 50 TABLET ORAL at 10:18

## 2021-10-11 RX ADMIN — ALBUTEROL SULFATE 2.5 MG: 2.5 SOLUTION RESPIRATORY (INHALATION) at 12:10

## 2021-10-11 RX ADMIN — IPRATROPIUM BROMIDE 0.5 MG: 0.5 SOLUTION RESPIRATORY (INHALATION) at 12:10

## 2021-10-11 ASSESSMENT — FIBROSIS 4 INDEX: FIB4 SCORE: 1.2

## 2021-10-11 NOTE — ED TRIAGE NOTES
"Chief Complaint   Patient presents with   • Shortness of Breath      cough starte dabout one week ago, increased SOB since then. Pt 90% on RA at rest. Pt states he has a pulse oximeter at home and was at 83% at rest. Had 2 albuterol nebulizer at home with \" some\" relief. Pt is vaccinated for covid. SOB worse with activity.   "

## 2021-10-11 NOTE — FLOWSHEET NOTE
10/11/21 1211   Events/Summary/Plan   Events/Summary/Plan SVN given   Vital Signs   Pulse 86   Respiration (!) 34   Pulse Oximetry 90 %   $ Pulse Oximetry (Spot Check) Yes   Respiratory Assessment   Level of Consciousness Alert   Chest Exam   Work Of Breathing / Effort Shallow   Oxygen   O2 Delivery Device Room air w/o2 available

## 2021-10-11 NOTE — ED PROVIDER NOTES
ED Provider Note    CHIEF COMPLAINT  Chief Complaint   Patient presents with   • Shortness of Breath       HPI  Bg Edwards is a 61 y.o. male who presents to the emergency room shortness of breath. Symptoms started about two or three weeks ago. Certainly worse over the last couple weeks in particular the last three days. Has a cough congestion runny nose. His cough is moist but nonproductive. He is not had any hemoptysis. He does feel more short of breath when he lays down flat. Better when he is upright. Worse with exertion as well and hasn't had as much exercise intolerance as he has in the past. He has not had a fever chills. Denies any chest pain or pleuritic symptoms. No new leg swelling or leg pain however a few months ago he did have a period of time where he became quite edematous and then this seems to have improved. He was evaluated in the emergency department at that time. On review of the chart his evaluation was reassuring. He denies headache, fevers, abdominal pain although questions about some asymmetry of his abdomen with a bulge on the right. No nausea vomiting diarrhea. Wife gave patient one of her albuterol nebulizer treatments early this morning which seemed to help.    REVIEW OF SYSTEMS  As per HPI, otherwise a 10 point review of systems is negative    PAST MEDICAL HISTORY  Past Medical History:   Diagnosis Date   • Abnormal EKG    • Atrial fibrillation (HCC)    • Chickenpox    • Diabetes (HCC)    • Dyspnea    • [place] measles    • Hypertension    • Influenza    • Mixed hyperlipidemia 10/1/2018   • Pain 6   • Paroxysmal atrial fibrillation (HCC)    • Snoring    • Tonsillitis        Family history  No pertinent family medical history    SOCIAL HISTORY  Social History     Tobacco Use   • Smoking status: Former Smoker     Types: Cigars     Quit date:      Years since quittin.7   • Smokeless tobacco: Former User     Types: Chew     Quit date: 2021   • Tobacco comment: 15  "YEARS  QUIT IN 95   Vaping Use   • Vaping Use: Never used   Substance Use Topics   • Alcohol use: Not Currently     Alcohol/week: 1.5 oz     Types: 3 Standard drinks or equivalent per week     Comment: Daily    • Drug use: Yes     Frequency: 1.0 times per week     Types: Marijuana     Comment: Marijuana       SURGICAL HISTORY  Past Surgical History:   Procedure Laterality Date   • FOREIGN BODY REMOVAL  6/29/2010    Performed by BRI HICKS at SURGERY SAME DAY [facility] ORS   • LUMBAR LAMINECTOMY DISKECTOMY Bilateral    • OTHER ORTHOPEDIC SURGERY  AC RECONSTRUCTION   • OTHER ORTHOPEDIC SURGERY  LEFT THUMB   • TONSILLECTOMY     • VASECTOMY         CURRENT MEDICATIONS  Home Medications    **Home medications have not yet been reviewed for this encounter**         ALLERGIES  Allergies   Allergen Reactions   • Nkda [No Known Drug Allergy]        PHYSICAL EXAM  VITAL SIGNS: /81   Pulse (!) 107   Temp 36.5 °C (97.7 °F) (Temporal)   Resp 20   Ht 1.981 m (6' 6\")   Wt (!) 151 kg (333 lb 8.9 oz)   SpO2 93%   BMI 38.55 kg/m²    Constitutional: Awake and alert  HENT: Normal inspection  Eyes: Normal inspection  Neck: Grossly normal range of motion. No JVD  Cardiovascular: elevated heart rate, Normal rhythm.  Symmetric peripheral pulses.   Thorax & Lungs: No respiratory distress, refused wheezing without crackles or rhonchi  Abdomen: medically obese. Asymmetric abdominal musculature on the right. I cannot palpate any distinct hernia. There is no tenderness. No hepatocellular Sushila. No palpable mass  Skin: No obvious rash.  Extremities: No clubbing, cyanosis, edema, no Homans or cords.  Neurologic: Grossly normal   Psychiatric: Normal for situation    RADIOLOGY/PROCEDURES  EC-ECHOCARDIOGRAM COMPLETE W/O CONT   Final Result      DX-CHEST-PORTABLE (1 VIEW)   Final Result      Decreasing lung volumes with some new reticular peripheral opacities that could be from edema favored over just atelectasis      Mild cardiac " silhouette enlargement           Imaging is interpreted by radiologist    Labs:  Results for orders placed or performed during the hospital encounter of 10/11/21   CBC WITH DIFFERENTIAL   Result Value Ref Range    WBC 6.9 4.8 - 10.8 K/uL    RBC 5.42 4.70 - 6.10 M/uL    Hemoglobin 16.2 14.0 - 18.0 g/dL    Hematocrit 49.8 42.0 - 52.0 %    MCV 91.9 81.4 - 97.8 fL    MCH 29.9 27.0 - 33.0 pg    MCHC 32.5 (L) 33.7 - 35.3 g/dL    RDW 46.9 35.9 - 50.0 fL    Platelet Count 217 164 - 446 K/uL    MPV 10.0 9.0 - 12.9 fL    Neutrophils-Polys 61.50 44.00 - 72.00 %    Lymphocytes 18.70 (L) 22.00 - 41.00 %    Monocytes 8.60 0.00 - 13.40 %    Eosinophils 10.50 (H) 0.00 - 6.90 %    Basophils 0.40 0.00 - 1.80 %    Immature Granulocytes 0.30 0.00 - 0.90 %    Nucleated RBC 0.00 /100 WBC    Neutrophils (Absolute) 4.22 1.82 - 7.42 K/uL    Lymphs (Absolute) 1.28 1.00 - 4.80 K/uL    Monos (Absolute) 0.59 0.00 - 0.85 K/uL    Eos (Absolute) 0.72 (H) 0.00 - 0.51 K/uL    Baso (Absolute) 0.03 0.00 - 0.12 K/uL    Immature Granulocytes (abs) 0.02 0.00 - 0.11 K/uL    NRBC (Absolute) 0.00 K/uL   COMP METABOLIC PANEL   Result Value Ref Range    Sodium 141 135 - 145 mmol/L    Potassium 4.4 3.6 - 5.5 mmol/L    Chloride 102 96 - 112 mmol/L    Co2 30 20 - 33 mmol/L    Anion Gap 9.0 7.0 - 16.0    Glucose 103 (H) 65 - 99 mg/dL    Bun 17 8 - 22 mg/dL    Creatinine 1.14 0.50 - 1.40 mg/dL    Calcium 10.1 8.4 - 10.2 mg/dL    AST(SGOT) 32 12 - 45 U/L    ALT(SGPT) 33 2 - 50 U/L    Alkaline Phosphatase 77 30 - 99 U/L    Total Bilirubin 0.5 0.1 - 1.5 mg/dL    Albumin 4.9 3.2 - 4.9 g/dL    Total Protein 7.7 6.0 - 8.2 g/dL    Globulin 2.8 1.9 - 3.5 g/dL    A-G Ratio 1.8 g/dL   TROPONIN   Result Value Ref Range    Troponin T 25 (H) 6 - 19 ng/L   proBrain Natriuretic Peptide, NT   Result Value Ref Range    NT-proBNP 46 0 - 125 pg/mL   COV-2, FLU A/B, AND RSV BY PCR (2-4 HOURS [place]): Collect NP swab in VTM    Specimen: Nasopharyngeal; Respirate   Result Value Ref  Range    Influenza virus A RNA Negative Negative    Influenza virus B, PCR Negative Negative    RSV, PCR Negative Negative    SARS-CoV-2 by PCR NotDetected     SARS-CoV-2 Source NP Swab    ESTIMATED GFR   Result Value Ref Range    GFR If African American >60 >60 mL/min/1.73 m 2    GFR If Non African American >60 >60 mL/min/1.73 m 2   TROPONIN   Result Value Ref Range    Troponin T 24 (H) 6 - 19 ng/L   EKG   Result Value Ref Range    Report       [facility] Emergency [facility].    Test Date:  2021-10-11  Pt Name:    ANANTH SOLANO            Department: [facility]  MRN:        [id number]                      [facility]  Gender:     Male                         Technician: BEN  :        1960                   Requested By:ER TRIAGE PROTOCOL  Order #:    [id number]                    Reading MD: HAYDER ROWAN MD    Measurements  Intervals                                Axis  Rate:       99                           P:          18  MA:         164                          QRS:        -68  QRSD:       94                           T:          36  QT:         376  QTc:        483    Interpretive Statements  SINUS RHYTHM  LEFT ATRIAL ABNORMALITY  inferior Q waves unchanged  LAFB  Compared to ECG 2021 13:02:58  no significant change  Electronically Signed On 10- 10:37:40 PDT by HAYDER ROWAN MD         Medications   predniSONE (DELTASONE) tablet 50 mg ( Oral Canceled Entry 10/11/21 1030)   albuterol inhaler 4 Puff (4 Puffs Inhalation Given 10/11/21 1037)   predniSONE (DELTASONE) tablet 60 mg (60 mg Oral Given 10/11/21 1018)   ipratropium (ATROVENT) 0.02 % nebulizer solution 0.5 mg (0.5 mg Nebulization Given 10/11/21 1210)   albuterol (PROVENTIL) 2.5mg/0.5ml nebulizer solution 2.5 mg (2.5 mg Nebulization Given 10/11/21 1210)         COURSE & MEDICAL DECISION MAKING  patient presents with dyspnea. He has significant bronchospasm on examination and I suspect pulmonary source  however cannot completely rule out cardiac wheezing. Will rule out pneumonia he has some URI symptoms will need rule out COVID. Unlikely pulmonary embolism given obvious pulmonary findings.    Patient is EKG does not show any acute changes, but is abnormal.    Patient will be given albuterol metered dose inhaler. 60 mg of prednisone was ordered.    Patient with improvement of aeration and clearing of breath sounds to some degree but still wheezing after treatment.    Chest x-ray demonstrates findings as noted above. I suspect that this is a hyperventilate Tory x-ray and the patient confirmed this unquestioning. He stated that he had not taken a breath yet. His BNP is normal. Troponin is in determinedly elevated. Will repeat troponin. CBC shows eosinophil ER. This would be more consistent with reactive airway's possibly from Allergan, but I still have concern for possible pulmonary edema. I paged cardiology reviewed case with Dr. Jurado. We will get an echocardiogram in the emergency department to help clarify. Patient states that he will not stay in the hospital of this is advised.    Patient admitted to ED observation at 11:40 AM    Patient's COVID screen is negative. Still wheezing. With marginal oxygen saturation. Will give albuterol Atrovent nebulizer treatment.    Repeat troponin returns unchanged. I suspect minimal elevation of the troponin is related to lower oxygen saturation and or perhaps tachycardia related to active illness.    Repeat pulmonary examination demonstrates significant clearing of lung fields. Patient is feeling much better. Oxygen saturation is acceptable. Patient describes that every time that he smokes marijuana he has shortness of breath. He does have an eosinophil ER.  May be redeveloping intolerance.    Echocardiogram returned negative.  Patient's vital signs are stable.  He is not hypoxic.  He feels much improved.  I will initiate him on a pulse of prednisone.  Prescription for  Singulair.  Albuterol inhaler as needed.  Precautioned him to return to the ER for any chest pain, worsening symptoms, not improving or concern.  Follow-up with primary provider within 1 week.    FINAL IMPRESSION  1.  Reactive airway disease with acute bronchospasm  2. Suspected marijuana intolerance    Discharged from ED observation at 3 PM 10/11/2021     this dictation was created using voice recognition software. The accuracy of the dictation is limited to the abilities of the software.  The nursing notes were reviewed and certain aspects of this information were incorporated into this note.      Electronically signed by: Tito Issa M.D., 10/11/2021 10:29 AM

## 2021-10-12 ENCOUNTER — TELEPHONE (OUTPATIENT)
Dept: SLEEP MEDICINE | Facility: MEDICAL CENTER | Age: 61
End: 2021-10-12

## 2021-10-12 DIAGNOSIS — G47.33 OSA (OBSTRUCTIVE SLEEP APNEA): ICD-10-CM

## 2021-10-12 NOTE — TELEPHONE ENCOUNTER
Patient spouse Yumiko Calderon called. A script for a CPAP was written last year on 10-7-20. He lost the prescription and just found it. It was written for a Gonzalez machine. He needs a new prescription for a new machine. The phone number is [phone]. They may want to go online to get the machine. Please call them.

## 2021-10-13 NOTE — TELEPHONE ENCOUNTER
Patient was Last Seen 10/07/2020 ELLA KAPLAN     No Pending follow up scheduled. Okay to pend order or would patient need to be seen first ?     Forward to provider with pending order and send to [facility] if approved

## 2021-10-19 NOTE — TELEPHONE ENCOUNTER
Patient's wife Marko calling again. Looking for order that was pended to provider yesterday by Malorie for C-pap.     If approved we need to call Marko patient's wife, with the order. [phone].

## 2021-10-20 NOTE — TELEPHONE ENCOUNTER
I called and spoke to Marko patient's wife and she wanted us to e-mail order to them. I did so while I had her on the phone.     They got our e-mail and will handle as they are purchasing out of pocket.

## 2023-10-04 ENCOUNTER — APPOINTMENT (OUTPATIENT)
Dept: URGENT CARE | Facility: PHYSICIAN GROUP | Age: 63
End: 2023-10-04

## 2024-03-21 ENCOUNTER — OFFICE VISIT (OUTPATIENT)
Dept: URGENT CARE | Facility: PHYSICIAN GROUP | Age: 64
End: 2024-03-21
Payer: COMMERCIAL

## 2024-03-21 VITALS
TEMPERATURE: 97.4 F | OXYGEN SATURATION: 92 % | DIASTOLIC BLOOD PRESSURE: 90 MMHG | WEIGHT: 304 LBS | SYSTOLIC BLOOD PRESSURE: 132 MMHG | RESPIRATION RATE: 18 BRPM | HEIGHT: 77 IN | HEART RATE: 108 BPM | BODY MASS INDEX: 35.89 KG/M2

## 2024-03-21 DIAGNOSIS — M25.561 RIGHT KNEE PAIN, UNSPECIFIED CHRONICITY: ICD-10-CM

## 2024-03-21 DIAGNOSIS — R03.0 ELEVATED BLOOD PRESSURE READING: ICD-10-CM

## 2024-03-21 PROCEDURE — 3075F SYST BP GE 130 - 139MM HG: CPT

## 2024-03-21 PROCEDURE — 99214 OFFICE O/P EST MOD 30 MIN: CPT

## 2024-03-21 PROCEDURE — 3080F DIAST BP >= 90 MM HG: CPT

## 2024-03-21 RX ORDER — PREDNISONE 20 MG/1
40 TABLET ORAL DAILY
Qty: 10 TABLET | Refills: 0 | Status: SHIPPED | OUTPATIENT
Start: 2024-03-21 | End: 2024-03-26

## 2024-03-21 NOTE — PROGRESS NOTES
Subjective:   Bg Edwards is a 63 y.o. male who presents for Knee Pain (Knee pain, swelling, feels hot, potentially gout, and cannot bend it.)      HPI:    Patient presents to urgent care with concerns of gout flare  He reports right knee pain, swelling, warmth  Onset was approximately 3 days ago  Has tried ice packs, ibuprofen  Has history of chronic gout, typically well controlled with allopurinol.  He has PCP appointment next week on 3/26/24.  Denies fever, chills  Denies traumatic injuries  Pain is aggravated with bending his knee  Cherry juice is not helping.      ROS As above in HPI    Medications:    Current Outpatient Medications on File Prior to Visit   Medication Sig Dispense Refill    glimepiride (AMARYL) 1 MG tablet Take 1 mg by mouth every morning.      POTASSIUM PO Take 1 Tablet by mouth every day.      montelukast (SINGULAIR) 4 MG Chew Tab Chew 1 Tablet every day. 30 Tablet 0    MILK THISTLE PO Take 1 tablet by mouth every day.      naproxen (ALEVE) 220 MG tablet Take 440 mg by mouth 1 time a day as needed.      lisinopril-hydrochlorothiazide (PRINZIDE) 20-12.5 MG per tablet Take 1 tablet by mouth once daily 30 tablet 0    allopurinol (ZYLOPRIM) 300 MG Tab Take 1 tablet by mouth once daily (Patient taking differently: Take 300 mg by mouth every day.) 30 Tab 2    amLODIPine (NORVASC) 10 MG Tab Take 1 Tab by mouth every day. 90 Tab 1    metFORMIN (GLUCOPHAGE) 500 MG Tab Take 500 mg by mouth 2 times a day.      atorvastatin (LIPITOR) 10 MG Tab Take 10 mg by mouth every day.      tadalafil (CIALIS) 5 MG tablet Take 5 mg by mouth every day.      albuterol 108 (90 Base) MCG/ACT Aero Soln inhalation aerosol Inhale 2 Puffs every 6 hours as needed for Shortness of Breath. (Patient not taking: Reported on 3/21/2024) 8.5 g 0    omeprazole (PRILOSEC) 20 MG delayed-release capsule Take 20 mg by mouth 1 time a day as needed. (Patient not taking: Reported on 3/21/2024)       No current  "facility-administered medications on file prior to visit.        Allergies:   Nkda [no known drug allergy]    Problem List:   Patient Active Problem List   Diagnosis    HTN (hypertension)    SOB (shortness of breath)    Atrial fibrillation (HCC)    Chronic gout    Morbid obesity with BMI of 40.0-44.9, adult (HCC)    Mixed hyperlipidemia    KIT (obstructive sleep apnea)    Pre-operative cardiovascular examination    Nonspecific abnormal electrocardiogram (ECG) (EKG)    Elevated fasting blood sugar        Surgical History:  Past Surgical History:   Procedure Laterality Date    FOREIGN BODY REMOVAL  2010    [facility] BRI [facility]    LUMBAR LAMINECTOMY DISKECTOMY Bilateral     OTHER ORTHOPEDIC SURGERY  AC RECONSTRUCTION    OTHER ORTHOPEDIC SURGERY  LEFT THUMB    TONSILLECTOMY      VASECTOMY         Past Social Hx:   Social History     Tobacco Use    Smoking status: Former     Types: Cigars     Quit date:      Years since quittin.    Smokeless tobacco: Former     Types: Chew     Quit date: 2021    Tobacco comments:     15 YEARS  QUIT IN    Vaping Use    Vaping Use: Never used   Substance Use Topics    Alcohol use: Not Currently     Alcohol/week: 1.5 oz     Types: 3 Standard drinks or equivalent per week     Comment: Daily     Drug use: Yes     Frequency: 1.0 times per week     Types: Marijuana     Comment: Marijuana          Problem list, medications, and allergies reviewed by myself today in Epic.     Objective:     BP (!) 132/90 (BP Location: Left arm, Patient Position: Sitting, BP Cuff Size: Large adult)   Pulse (!) 108   Temp 36.3 °C (97.4 °F) (Temporal)   Resp 18   Ht 1.956 m (6' 5\")   Wt (!) 138 kg (304 lb)   SpO2 92%   BMI 36.05 kg/m²     Physical Exam  Vitals and nursing note reviewed.   Constitutional:       Appearance: Normal appearance.   Cardiovascular:      Rate and Rhythm: Normal rate and regular rhythm.      Heart sounds: Normal heart " sounds.   Pulmonary:      Effort: Pulmonary effort is normal.      Breath sounds: Normal breath sounds.   Abdominal:      General: Bowel sounds are normal.      Palpations: Abdomen is soft.   Musculoskeletal:         General: Swelling and tenderness present. No deformity or signs of injury.      Right lower leg: No edema.      Left lower leg: No edema.      Comments: Right knee is mildly edematous, erythematous, warm. Not fluctuant. No ecchymosis. There is reduced range of motion secondary to pain. No tophi.   Skin:     Capillary Refill: Capillary refill takes less than 2 seconds.   Neurological:      Mental Status: He is alert and oriented to person, place, and time.         Assessment/Plan:     Diagnosis and associated orders:   1. Right knee pain, unspecified chronicity  - predniSONE (DELTASONE) 20 MG Tab; Take 2 Tablets by mouth every day for 5 days.  Dispense: 10 Tablet; Refill: 0    2. Elevated blood pressure reading      Comments/MDM:     Differential discussed with patient: pseudogout vs inflammatory arthritis.   Recommend: dietary measures, decreased EtOH, elevation, application of ice packs.  He currently takes lisinopril-HCTZ .  Declined colchicine.   States he has had luck with prednisone in the past. Would like to trial prednisone today.   Return to UC should symptoms fail to improve  Follow up with PCP advised.     Return to clinic or go to ED if symptoms worsen or persist. Indications for ED discussed at length. Patient/Parent/Guardian voices understanding. Follow-up with your primary care provider in 3-5 days. Red flag symptoms discussed. All side effects of medication discussed including allergic response, GI upset, tendon injury, rash, sedation etc.    Please note that this dictation was created using voice recognition software. I have made a reasonable attempt to correct obvious errors, but I expect that there are errors of grammar and possibly content that I did not discover before finalizing the  note.    This note was electronically signed by ROSAURA Logan

## 2024-05-29 ENCOUNTER — OFFICE VISIT (OUTPATIENT)
Dept: URGENT CARE | Facility: CLINIC | Age: 64
End: 2024-05-29
Payer: COMMERCIAL

## 2024-05-29 ENCOUNTER — HOSPITAL ENCOUNTER (OUTPATIENT)
Facility: MEDICAL CENTER | Age: 64
End: 2024-05-29
Payer: COMMERCIAL

## 2024-05-29 VITALS
SYSTOLIC BLOOD PRESSURE: 128 MMHG | BODY MASS INDEX: 36.45 KG/M2 | OXYGEN SATURATION: 94 % | HEIGHT: 78 IN | RESPIRATION RATE: 20 BRPM | WEIGHT: 315 LBS | HEART RATE: 105 BPM | DIASTOLIC BLOOD PRESSURE: 84 MMHG | TEMPERATURE: 96.9 F

## 2024-05-29 DIAGNOSIS — N30.01 ACUTE CYSTITIS WITH HEMATURIA: ICD-10-CM

## 2024-05-29 LAB
APPEARANCE UR: NORMAL
BILIRUB UR STRIP-MCNC: NEGATIVE MG/DL
COLOR UR AUTO: NORMAL
GLUCOSE UR STRIP.AUTO-MCNC: NEGATIVE MG/DL
KETONES UR STRIP.AUTO-MCNC: NORMAL MG/DL
LEUKOCYTE ESTERASE UR QL STRIP.AUTO: NORMAL
NITRITE UR QL STRIP.AUTO: NEGATIVE
PH UR STRIP.AUTO: 7 [PH] (ref 5–8)
PROT UR QL STRIP: 100 MG/DL
RBC UR QL AUTO: NORMAL
SP GR UR STRIP.AUTO: 1.02
UROBILINOGEN UR STRIP-MCNC: NORMAL MG/DL

## 2024-05-29 RX ORDER — SULFAMETHOXAZOLE AND TRIMETHOPRIM 800; 160 MG/1; MG/1
1 TABLET ORAL 2 TIMES DAILY
Qty: 20 TABLET | Refills: 0 | Status: SHIPPED | OUTPATIENT
Start: 2024-05-29 | End: 2024-06-08

## 2024-05-30 NOTE — PROGRESS NOTES
"Chief Complaint   Patient presents with    UTI     Uti or bladder infection/ x2 days/ burning when urinating/ unable to hold in urine/urinary frequency/ blood in urine         Subjective:   HISTORY OF PRESENT ILLNESS: Bg Edwards is a 64 y.o. male who presents for dysuria, frequency and urgency x 2 days. No penile discharge or STD concerns at this time. Patient denies fevers, flank pain, rectal pain or hx of kidney stones, he is tolerating PO .  He does report a hx of enlarged prostate.         Medications, Allergies, current problem list, Social and Family history reviewed today in Epic.     Objective:     /84   Pulse (!) 105   Temp 36.1 °C (96.9 °F) (Temporal)   Resp 20   Ht 1.981 m (6' 6\")   Wt (!) 145 kg (319 lb 14.4 oz)   SpO2 94%     Physical Exam  Vitals reviewed.   Constitutional:       General: He is not in acute distress.     Appearance: Normal appearance. He is not ill-appearing.   HENT:      Mouth/Throat:      Mouth: Mucous membranes are moist.   Cardiovascular:      Rate and Rhythm: Normal rate.   Pulmonary:      Effort: Pulmonary effort is normal.   Abdominal:      General: Abdomen is flat. Bowel sounds are normal. There is no distension.      Palpations: Abdomen is soft.      Tenderness: There is no abdominal tenderness.   Genitourinary:     Comments: Deferred per pt  Skin:     General: Skin is warm and dry.   Neurological:      Mental Status: He is alert and oriented to person, place, and time.   Psychiatric:         Mood and Affect: Mood normal.          Assessment/Plan:     Diagnosis and associated orders    I personally reviewed prior external notes and test results pertinent to today's visit.     1. Acute cystitis with hematuria  sulfamethoxazole-trimethoprim (BACTRIM DS) 800-160 MG tablet    URINE CULTURE(NEW)    POCT Urinalysis        Results for orders placed or performed in visit on 05/29/24   POCT Urinalysis   Result Value Ref Range    POC Color BELINDA Negative    " POC Appearance CLOUDY Negative    POC Glucose NEGATIVE Negative mg/dL    POC Bilirubin NEGATIVE Negative mg/dL    POC Ketones TRACE Negative mg/dL    POC Specific Gravity 1.025 <1.005 - >1.030    POC Blood LARGE Negative    POC Urine PH 7.0 5.0 - 8.0    POC Protein 100 Negative mg/dL    POC Urobiligen 0.2 Negative (0.2) mg/dL    POC Nitrites NEGATIVE Negative    POC Leukocyte Esterase SMALL Negative        IMPRESSION:  Pt has stable vital signs and no red flag symptoms or exam findings identified.   Informed pt that symptoms are consistent with UTI, will send bactrim.  Advised to push oral fluids    Differential diagnosis discussed. Pt was Educated on red flag symptoms. Pt has been Instructed to return to Urgent Care or nearest Emergency Department if symptoms fail to improve, for any change in condition, further concerns, or new concerning symptoms. Patient states understanding of the plan of care and discharge instructions.  They are discharged in stable condition.         Please note that this dictation was created using voice recognition software. I have made a reasonable attempt to correct obvious errors, but I expect that there are errors of grammar and possibly content that I did not discover before finalizing the note.    This note was electronically signed by LETY Maloney

## 2024-05-31 LAB
BACTERIA UR CULT: NORMAL
SIGNIFICANT IND 70042: NORMAL
SITE SITE: NORMAL
SOURCE SOURCE: NORMAL

## 2024-06-01 LAB
BACTERIA UR CULT: ABNORMAL
BACTERIA UR CULT: ABNORMAL
SIGNIFICANT IND 70042: ABNORMAL
SITE SITE: ABNORMAL
SOURCE SOURCE: ABNORMAL

## 2024-08-27 RX ORDER — SULFAMETHOXAZOLE/TRIMETHOPRIM 800-160 MG
1 TABLET ORAL 2 TIMES DAILY
Qty: 20 TABLET | Refills: 0 | OUTPATIENT
Start: 2024-08-27

## 2025-03-13 ENCOUNTER — APPOINTMENT (OUTPATIENT)
Dept: URBAN - METROPOLITAN AREA CLINIC 6 | Facility: CLINIC | Age: 65
Setting detail: DERMATOLOGY
End: 2025-03-13

## 2025-03-13 DIAGNOSIS — L84 CORNS AND CALLOSITIES: ICD-10-CM

## 2025-03-13 DIAGNOSIS — L20.89 OTHER ATOPIC DERMATITIS: ICD-10-CM | Status: INADEQUATELY CONTROLLED

## 2025-03-13 DIAGNOSIS — L82.1 OTHER SEBORRHEIC KERATOSIS: ICD-10-CM

## 2025-03-13 DIAGNOSIS — L57.0 ACTINIC KERATOSIS: ICD-10-CM

## 2025-03-13 DIAGNOSIS — B07.8 OTHER VIRAL WARTS: ICD-10-CM

## 2025-03-13 PROCEDURE — 17003 DESTRUCT PREMALG LES 2-14: CPT

## 2025-03-13 PROCEDURE — ? PRESCRIPTION MEDICATION MANAGEMENT

## 2025-03-13 PROCEDURE — ? PRESCRIPTION

## 2025-03-13 PROCEDURE — ? COUNSELING

## 2025-03-13 PROCEDURE — 99204 OFFICE O/P NEW MOD 45 MIN: CPT | Mod: 25

## 2025-03-13 PROCEDURE — ? LIQUID NITROGEN

## 2025-03-13 PROCEDURE — 17000 DESTRUCT PREMALG LESION: CPT

## 2025-03-13 PROCEDURE — ? ADDITIONAL NOTES

## 2025-03-13 PROCEDURE — ? DIAGNOSIS COMMENT

## 2025-03-13 RX ORDER — TRIAMCINOLONE ACETONIDE 0.25 MG/G
OINTMENT TOPICAL
Qty: 80 | Refills: 1 | Status: ERX | COMMUNITY
Start: 2025-03-13

## 2025-03-13 RX ADMIN — TRIAMCINOLONE ACETONIDE: 0.25 OINTMENT TOPICAL at 00:00

## 2025-03-13 ASSESSMENT — LOCATION DETAILED DESCRIPTION DERM
LOCATION DETAILED: LEFT SUPERIOR POSTERIOR HELIX
LOCATION DETAILED: LEFT DISTAL DORSAL FOREARM
LOCATION DETAILED: RIGHT SUPERIOR HELIX
LOCATION DETAILED: RIGHT INFERIOR POSTAURICULAR SKIN
LOCATION DETAILED: RIGHT ULNAR DORSAL HAND
LOCATION DETAILED: RIGHT SUPERIOR CENTRAL MALAR CHEEK
LOCATION DETAILED: LEFT PROXIMAL DORSAL FOREARM
LOCATION DETAILED: RIGHT DISTAL PALMAR INDEX FINGER
LOCATION DETAILED: LEFT DISTAL VENTRAL THUMB

## 2025-03-13 ASSESSMENT — LOCATION ZONE DERM
LOCATION ZONE: SCALP
LOCATION ZONE: FINGER
LOCATION ZONE: FACE
LOCATION ZONE: ARM
LOCATION ZONE: EAR
LOCATION ZONE: HAND

## 2025-03-13 ASSESSMENT — LOCATION SIMPLE DESCRIPTION DERM
LOCATION SIMPLE: LEFT FOREARM
LOCATION SIMPLE: LEFT EAR
LOCATION SIMPLE: RIGHT CHEEK
LOCATION SIMPLE: RIGHT INDEX FINGER
LOCATION SIMPLE: POSTERIOR SCALP
LOCATION SIMPLE: RIGHT EAR
LOCATION SIMPLE: RIGHT HAND
LOCATION SIMPLE: LEFT THUMB

## 2025-03-13 ASSESSMENT — BSA RASH: BSA RASH: 3

## 2025-03-13 ASSESSMENT — SEVERITY ASSESSMENT 2020: SEVERITY 2020: MILD

## 2025-03-13 NOTE — PROCEDURE: DIAGNOSIS COMMENT
Detail Level: Zone
Comment: Predominately involving the superior/inferior ears.
Render Risk Assessment In Note?: no

## 2025-03-13 NOTE — PROCEDURE: ADDITIONAL NOTES
Detail Level: Detailed
Render Risk Assessment In Note?: no
Additional Notes: Paired with 15 blade and treated with cryotherapy as a courtesy
Additional Notes: Pared with a 15 blade, recommended topical SA to exfoliate and corn pads to prevent recurrence.

## 2025-03-13 NOTE — PROCEDURE: PRESCRIPTION MEDICATION MANAGEMENT
Render In Strict Bullet Format?: No
Detail Level: Zone
Initiate Treatment: Triamcinolone 0.025% ointment BID PRN

## 2025-03-13 NOTE — PROCEDURE: LIQUID NITROGEN
Detail Level: Zone
Render Post-Care Instructions In Note?: no
Show Applicator Variable?: Yes
Number Of Freeze-Thaw Cycles: 2 freeze-thaw cycles
Duration Of Freeze Thaw-Cycle (Seconds): 8
Post-Care Instructions: I reviewed with the patient in detail post-care instructions. Patient is to wear sunprotection, and avoid picking at any of the treated lesions. Pt may apply Vaseline to crusted or scabbing areas.
Consent: The patient's verbal consent was obtained including but not limited to risks of crusting, scabbing, blistering, scarring, darker or lighter pigmentary change, recurrence, incomplete removal and infection.